# Patient Record
Sex: FEMALE | Race: WHITE | NOT HISPANIC OR LATINO | Employment: OTHER | URBAN - METROPOLITAN AREA
[De-identification: names, ages, dates, MRNs, and addresses within clinical notes are randomized per-mention and may not be internally consistent; named-entity substitution may affect disease eponyms.]

---

## 2017-05-19 ENCOUNTER — HOSPITAL ENCOUNTER (INPATIENT)
Facility: HOSPITAL | Age: 82
LOS: 5 days | Discharge: HOSPICE | DRG: 872 | End: 2017-05-24
Attending: EMERGENCY MEDICINE | Admitting: INTERNAL MEDICINE
Payer: MEDICARE

## 2017-05-19 DIAGNOSIS — R79.89 ELEVATED TROPONIN: ICD-10-CM

## 2017-05-19 DIAGNOSIS — I48.91 ATRIAL FIBRILLATION WITH RVR: Primary | ICD-10-CM

## 2017-05-19 DIAGNOSIS — J20.9 ACUTE BRONCHITIS, UNSPECIFIED ORGANISM: ICD-10-CM

## 2017-05-19 DIAGNOSIS — R00.0 TACHYCARDIA: ICD-10-CM

## 2017-05-19 DIAGNOSIS — I48.91 A-FIB: ICD-10-CM

## 2017-05-19 PROBLEM — A41.9 SEPSIS: Status: ACTIVE | Noted: 2017-05-19

## 2017-05-19 PROBLEM — N17.9 ACUTE RENAL FAILURE: Status: ACTIVE | Noted: 2017-05-19

## 2017-05-19 PROBLEM — E03.4 ATROPHY OF THYROID: Status: ACTIVE | Noted: 2017-05-19

## 2017-05-19 PROBLEM — E78.2 MIXED HYPERLIPIDEMIA: Status: ACTIVE | Noted: 2017-05-19

## 2017-05-19 PROBLEM — I10 ESSENTIAL HYPERTENSION, BENIGN: Status: ACTIVE | Noted: 2017-05-19

## 2017-05-19 LAB
ALBUMIN SERPL BCP-MCNC: 2.7 G/DL
ALP SERPL-CCNC: 82 U/L
ALT SERPL W/O P-5'-P-CCNC: 38 U/L
ANION GAP SERPL CALC-SCNC: 12 MMOL/L
AORTIC VALVE REGURGITATION: ABNORMAL
AST SERPL-CCNC: 45 U/L
BASOPHILS # BLD AUTO: 0.02 K/UL
BASOPHILS NFR BLD: 0.1 %
BILIRUB SERPL-MCNC: 2.5 MG/DL
BUN SERPL-MCNC: 37 MG/DL
CALCIUM SERPL-MCNC: 8.5 MG/DL
CHLORIDE SERPL-SCNC: 95 MMOL/L
CO2 SERPL-SCNC: 26 MMOL/L
CREAT SERPL-MCNC: 1.5 MG/DL
DIASTOLIC DYSFUNCTION: NO
DIFFERENTIAL METHOD: ABNORMAL
EOSINOPHIL # BLD AUTO: 0 K/UL
EOSINOPHIL NFR BLD: 0 %
ERYTHROCYTE [DISTWIDTH] IN BLOOD BY AUTOMATED COUNT: 14.6 %
EST. GFR  (AFRICAN AMERICAN): 34 ML/MIN/1.73 M^2
EST. GFR  (NON AFRICAN AMERICAN): 29 ML/MIN/1.73 M^2
ESTIMATED PA SYSTOLIC PRESSURE: 41.64
GLOBAL PERICARDIAL EFFUSION: ABNORMAL
GLUCOSE SERPL-MCNC: 180 MG/DL
HCT VFR BLD AUTO: 40.9 %
HGB BLD-MCNC: 14.2 G/DL
LYMPHOCYTES # BLD AUTO: 0.6 K/UL
LYMPHOCYTES NFR BLD: 4.1 %
MAGNESIUM SERPL-MCNC: 3.1 MG/DL
MCH RBC QN AUTO: 32.6 PG
MCHC RBC AUTO-ENTMCNC: 34.7 %
MCV RBC AUTO: 94 FL
MITRAL VALVE MOBILITY: NORMAL
MONOCYTES # BLD AUTO: 1.6 K/UL
MONOCYTES NFR BLD: 10.1 %
NEUTROPHILS # BLD AUTO: 13.3 K/UL
NEUTROPHILS NFR BLD: 85.7 %
PLATELET # BLD AUTO: 156 K/UL
PMV BLD AUTO: 12.3 FL
POTASSIUM SERPL-SCNC: 4.6 MMOL/L
PROT SERPL-MCNC: 7 G/DL
RBC # BLD AUTO: 4.36 M/UL
RETIRED EF AND QEF - SEE NOTES: 60 (ref 55–65)
SODIUM SERPL-SCNC: 133 MMOL/L
T4 FREE SERPL-MCNC: 1.06 NG/DL
TRICUSPID VALVE REGURGITATION: ABNORMAL
TROPONIN I SERPL DL<=0.01 NG/ML-MCNC: 0.09 NG/ML
TROPONIN I SERPL DL<=0.01 NG/ML-MCNC: 0.1 NG/ML
TSH SERPL DL<=0.005 MIU/L-ACNC: 0.39 UIU/ML
WBC # BLD AUTO: 15.51 K/UL

## 2017-05-19 PROCEDURE — 93306 TTE W/DOPPLER COMPLETE: CPT

## 2017-05-19 PROCEDURE — 83735 ASSAY OF MAGNESIUM: CPT

## 2017-05-19 PROCEDURE — 25000003 PHARM REV CODE 250: Performed by: EMERGENCY MEDICINE

## 2017-05-19 PROCEDURE — 99223 1ST HOSP IP/OBS HIGH 75: CPT | Mod: ,,, | Performed by: INTERNAL MEDICINE

## 2017-05-19 PROCEDURE — 25000003 PHARM REV CODE 250: Performed by: INTERNAL MEDICINE

## 2017-05-19 PROCEDURE — 93306 TTE W/DOPPLER COMPLETE: CPT | Mod: 26,,, | Performed by: INTERNAL MEDICINE

## 2017-05-19 PROCEDURE — 85025 COMPLETE CBC W/AUTO DIFF WBC: CPT

## 2017-05-19 PROCEDURE — 20000000 HC ICU ROOM

## 2017-05-19 PROCEDURE — 96361 HYDRATE IV INFUSION ADD-ON: CPT

## 2017-05-19 PROCEDURE — 96365 THER/PROPH/DIAG IV INF INIT: CPT

## 2017-05-19 PROCEDURE — 84439 ASSAY OF FREE THYROXINE: CPT

## 2017-05-19 PROCEDURE — 36415 COLL VENOUS BLD VENIPUNCTURE: CPT

## 2017-05-19 PROCEDURE — 84443 ASSAY THYROID STIM HORMONE: CPT

## 2017-05-19 PROCEDURE — 63600175 PHARM REV CODE 636 W HCPCS: Performed by: INTERNAL MEDICINE

## 2017-05-19 PROCEDURE — 84484 ASSAY OF TROPONIN QUANT: CPT | Mod: 91

## 2017-05-19 PROCEDURE — 80053 COMPREHEN METABOLIC PANEL: CPT

## 2017-05-19 PROCEDURE — 96366 THER/PROPH/DIAG IV INF ADDON: CPT

## 2017-05-19 PROCEDURE — 96376 TX/PRO/DX INJ SAME DRUG ADON: CPT

## 2017-05-19 PROCEDURE — 99291 CRITICAL CARE FIRST HOUR: CPT | Mod: 25

## 2017-05-19 RX ORDER — SIMVASTATIN 20 MG/1
20 TABLET, FILM COATED ORAL NIGHTLY
COMMUNITY

## 2017-05-19 RX ORDER — CLONAZEPAM 0.5 MG/1
0.5 TABLET ORAL 2 TIMES DAILY PRN
Status: DISCONTINUED | OUTPATIENT
Start: 2017-05-19 | End: 2017-05-23

## 2017-05-19 RX ORDER — SODIUM CHLORIDE 9 MG/ML
500 INJECTION, SOLUTION INTRAVENOUS
Status: COMPLETED | OUTPATIENT
Start: 2017-05-19 | End: 2017-05-19

## 2017-05-19 RX ORDER — DIGOXIN 0.25 MG/ML
250 INJECTION INTRAMUSCULAR; INTRAVENOUS ONCE
Status: COMPLETED | OUTPATIENT
Start: 2017-05-19 | End: 2017-05-19

## 2017-05-19 RX ORDER — FUROSEMIDE 20 MG/1
20 TABLET ORAL 2 TIMES DAILY
COMMUNITY

## 2017-05-19 RX ORDER — ATENOLOL 50 MG/1
50 TABLET ORAL DAILY
Status: DISCONTINUED | OUTPATIENT
Start: 2017-05-19 | End: 2017-05-19

## 2017-05-19 RX ORDER — FUROSEMIDE 20 MG/1
20 TABLET ORAL 2 TIMES DAILY
Status: DISCONTINUED | OUTPATIENT
Start: 2017-05-19 | End: 2017-05-24 | Stop reason: HOSPADM

## 2017-05-19 RX ORDER — LISINOPRIL 20 MG/1
20 TABLET ORAL DAILY
Status: DISCONTINUED | OUTPATIENT
Start: 2017-05-19 | End: 2017-05-20

## 2017-05-19 RX ORDER — AMLODIPINE BESYLATE 2.5 MG/1
2.5 TABLET ORAL DAILY
COMMUNITY

## 2017-05-19 RX ORDER — SIMVASTATIN 10 MG/1
20 TABLET, FILM COATED ORAL NIGHTLY
Status: DISCONTINUED | OUTPATIENT
Start: 2017-05-19 | End: 2017-05-24 | Stop reason: HOSPADM

## 2017-05-19 RX ORDER — AMLODIPINE BESYLATE 5 MG/1
5 TABLET ORAL DAILY
Status: ON HOLD | COMMUNITY
End: 2017-05-24 | Stop reason: HOSPADM

## 2017-05-19 RX ORDER — DILTIAZEM HYDROCHLORIDE 30 MG/1
60 TABLET, FILM COATED ORAL
Status: COMPLETED | OUTPATIENT
Start: 2017-05-19 | End: 2017-05-19

## 2017-05-19 RX ORDER — CLONAZEPAM 0.5 MG/1
0.5 TABLET ORAL 2 TIMES DAILY PRN
COMMUNITY

## 2017-05-19 RX ORDER — ATENOLOL 50 MG/1
50 TABLET ORAL DAILY
Status: ON HOLD | COMMUNITY
End: 2017-05-24 | Stop reason: HOSPADM

## 2017-05-19 RX ORDER — LEVOTHYROXINE SODIUM 88 UG/1
88 TABLET ORAL DAILY
Status: ON HOLD | COMMUNITY
End: 2017-05-24 | Stop reason: HOSPADM

## 2017-05-19 RX ORDER — LEVOTHYROXINE SODIUM 88 UG/1
88 TABLET ORAL
Status: DISCONTINUED | OUTPATIENT
Start: 2017-05-19 | End: 2017-05-22

## 2017-05-19 RX ORDER — AMLODIPINE BESYLATE 5 MG/1
5 TABLET ORAL DAILY
Status: DISCONTINUED | OUTPATIENT
Start: 2017-05-19 | End: 2017-05-19

## 2017-05-19 RX ORDER — DILTIAZEM HCL 1 MG/ML
5 INJECTION, SOLUTION INTRAVENOUS CONTINUOUS
Status: DISCONTINUED | OUTPATIENT
Start: 2017-05-19 | End: 2017-05-19

## 2017-05-19 RX ORDER — AMLODIPINE BESYLATE 2.5 MG/1
2.5 TABLET ORAL DAILY
Status: DISCONTINUED | OUTPATIENT
Start: 2017-05-19 | End: 2017-05-19

## 2017-05-19 RX ORDER — DILTIAZEM HYDROCHLORIDE 5 MG/ML
10 INJECTION INTRAVENOUS
Status: COMPLETED | OUTPATIENT
Start: 2017-05-19 | End: 2017-05-19

## 2017-05-19 RX ORDER — LISINOPRIL 20 MG/1
20 TABLET ORAL DAILY
COMMUNITY

## 2017-05-19 RX ORDER — DEXTROSE MONOHYDRATE, SODIUM CHLORIDE, AND POTASSIUM CHLORIDE 50; 1.49; 4.5 G/1000ML; G/1000ML; G/1000ML
INJECTION, SOLUTION INTRAVENOUS CONTINUOUS
Status: DISCONTINUED | OUTPATIENT
Start: 2017-05-19 | End: 2017-05-24 | Stop reason: HOSPADM

## 2017-05-19 RX ADMIN — CLONAZEPAM 0.5 MG: 0.5 TABLET ORAL at 09:05

## 2017-05-19 RX ADMIN — DILTIAZEM HYDROCHLORIDE 10 MG: 5 INJECTION INTRAVENOUS at 11:05

## 2017-05-19 RX ADMIN — LEVOTHYROXINE SODIUM 88 MCG: 88 TABLET ORAL at 03:05

## 2017-05-19 RX ADMIN — SIMVASTATIN 20 MG: 10 TABLET, FILM COATED ORAL at 09:05

## 2017-05-19 RX ADMIN — DEXTROSE MONOHYDRATE, SODIUM CHLORIDE, AND POTASSIUM CHLORIDE: 50; 4.5; 1.49 INJECTION, SOLUTION INTRAVENOUS at 01:05

## 2017-05-19 RX ADMIN — FUROSEMIDE 20 MG: 20 TABLET ORAL at 06:05

## 2017-05-19 RX ADMIN — DILTIAZEM HYDROCHLORIDE 5 MG/HR: 5 INJECTION INTRAVENOUS at 12:05

## 2017-05-19 RX ADMIN — SODIUM CHLORIDE 500 ML: 0.9 INJECTION, SOLUTION INTRAVENOUS at 10:05

## 2017-05-19 RX ADMIN — AMIODARONE HYDROCHLORIDE 1 MG/MIN: 1.8 INJECTION, SOLUTION INTRAVENOUS at 03:05

## 2017-05-19 RX ADMIN — DIGOXIN 250 MCG: 0.25 INJECTION INTRAMUSCULAR; INTRAVENOUS at 06:05

## 2017-05-19 RX ADMIN — CEFTRIAXONE 1 G: 1 INJECTION, SOLUTION INTRAVENOUS at 03:05

## 2017-05-19 RX ADMIN — AMIODARONE HYDROCHLORIDE 150 MG: 1.5 INJECTION, SOLUTION INTRAVENOUS at 03:05

## 2017-05-19 RX ADMIN — AMIODARONE HYDROCHLORIDE 0.5 MG/MIN: 1.8 INJECTION, SOLUTION INTRAVENOUS at 09:05

## 2017-05-19 RX ADMIN — DILTIAZEM HYDROCHLORIDE 60 MG: 30 TABLET, FILM COATED ORAL at 11:05

## 2017-05-19 RX ADMIN — AMIODARONE HYDROCHLORIDE 150 MG: 1.5 INJECTION, SOLUTION INTRAVENOUS at 05:05

## 2017-05-19 NOTE — ED NOTES
Spoke to son in law, Hany Rojas at [phone]. Please advise with any updates. He and his wife will be flying down tomorrow to discuss POC.

## 2017-05-19 NOTE — CONSULTS
[facility]  Cardiology  Consult Note    Patient Name: Jenny Perales  MRN: [id number]  Admission Date: 5/19/2017  Hospital Length of Stay: 0 days  Code Status: DNR   Attending Provider: Gail Deutsch MD   Consulting Provider: Homer Charles MD  Primary Care Physician: Gail Deutsch MD  Principal Problem:Atrial fibrillation with RVR    Patient information was obtained from patient and ER records.     Inpatient consult to Cardiology  Consult performed by: HOMER CHARLES  Consult ordered by: SANG DICKEY  Reason for consult: AF/RVR        Subjective:     Chief Complaint:  AF/RVR     HPI:   89 y.o. F, who has past medical history of CHF and A-fib, presents to the ED for evaluation of worsening generalized weakness and cough x2 days. On EMS arrival her HR was 170-180; they administered 20 of Cardizem and the rate came down to 130. Her rate has been over 200 previously and she needed to be cardioverted. 12 lead EKG showed no STEMI. Her cough has been chronic x1 month; she notes some abdominal pain secondary to cough. She also adds she is urinating on herself frequently. She is not sure if she is short of breath with lying flat. She denies chest pain, dizziness, gait issues, fever, chills, nausea and vomiting. On exam she is alert and oriented. She voices her desire for DNR. She ambulates with a walker. She does not smoke, drink or use street drugs.    Pt currently pain free.  Came to ER because of malaise and feelings on impending doom.  Noted to be in AF with RVR.  Rates better controlled with IV dilt.  Pt not eager to have ROMEO/DCCV or be on a blood thinner.      Past Medical History:   Diagnosis Date    Hyperlipidemia     Hypertension     Thyroid disease        Past Surgical History:   Procedure Laterality Date    THYROIDECTOMY         Review of patient's allergies indicates:   Allergen Reactions    Codeine Anxiety       No current facility-administered medications on file  prior to encounter.      No current outpatient prescriptions on file prior to encounter.     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    SpO2: 95 %         Intake/Output Summary (Last 24 hours) at 05/19/17 1445  Last data filed at 05/19/17 1400   Gross per 24 hour   Intake            21.13 ml   Output                0 ml   Net            21.13 ml       Lines/Drains/Airways     Peripheral Intravenous Line                 Peripheral IV - Single Lumen 05/19/17 1033 Left Forearm less than 1 day         Peripheral IV - Single Lumen 05/19/17 1239 Right Antecubital less than 1 day                Physical Exam    Current Medications:   amlodipine  2.5 mg Oral Daily    amlodipine  5 mg Oral Daily    atenolol  50 mg Oral Daily    cefTRIAXone (ROCEPHIN) IVPB  1 g Intravenous Q24H    furosemide  20 mg Oral BID    levothyroxine  88 mcg Oral Before breakfast    lisinopril  20 mg Oral Daily    simvastatin  20 mg Oral QHS      dextrose 5 % and 0.45 % NaCl with KCl 20 mEq 75 mL/hr at 05/19/17 1400    diltiazem 10 mg/hr (05/19/17 1430)     clonazePAM    Laboratory:  CBC:    Recent Labs  Lab 05/19/17  1050   WHITE BLOOD CELL COUNT 15.51  H   HEMOGLOBIN 14.2   HEMATOCRIT 40.9   PLATELETS 156       CHEMISTRIES:    Recent Labs  Lab 05/19/17  1050   GLUCOSE 180 H   SODIUM 133 L   POTASSIUM 4.6   BUN BLD 37 H   CREATININE 1.5 H   EGFR IF  34 A   EGFR IF NON- 29 A   CALCIUM 8.5 L   MAGNESIUM 3.1 H       CARDIAC BIOMARKERS:    Recent Labs  Lab 05/19/17  1050   TROPONIN I 0.104 H       COAGS:        LIPIDS/LFTS:    Recent Labs  Lab 05/19/17  1050   AST 45 H   ALT 38     No results found for: TSH      Diagnostic Results:  ECG (personally reviewed tracings):   5/19/17 1033 , inflat ST abnl ?isch    Chest X-Ray (personally reviewed image(s)): 5/19/17 NAD    Echo: ordered      Assessment and Plan:     * Atrial fibrillation with RVR  Change dilt to amio gtt  Stop amlod  Hold atenolol given borderline hemodynamics (and not a good long term choice given her renal insuff)  Digozin as a contingency  Check echo  Check TSH  Will further discuss ROMEO/DCCV and need for OAC with pt in am (she will contemplate for now).  DNR noted.    Elevated troponin  In setting of renal insuff and AF/RVR.  Doubt ACS.  No plan for isch eval given DNR status.  Will plan to restart BBl and cont statin rx.    Acute bronchitis  Monitor creat      VTE Risk Mitigation         Ordered     Medium Risk of VTE  Once      05/19/17 1346     Place SHAYLEE hose  Until discontinued      05/19/17 1346     Place sequential compression device  Until discontinued      05/19/17 1346          Thank you for your consult. I will follow-up with patient. Please contact us if you have any additional questions.    Homer Suero MD  Cardiology   [facility]

## 2017-05-19 NOTE — PLAN OF CARE
Problem: Patient Care Overview  Goal: Plan of Care Review  Outcome: Ongoing (interventions implemented as appropriate)  Pt remains in ICU, newly transferred from ED this shift. VSS/afebrile/ 5 L NC/denies pain. Originally on cardizem gtt, switched to amiodarone. Pt remains tachycardic HR 's post 150 amio bolus x 2 (see note); 250 mcg digoxin given. Tolerating diet; neurologically in tact. No skin breakdown, safety maintained precautions in place for both.

## 2017-05-19 NOTE — ED PROVIDER NOTES
Encounter Date: 5/19/2017    SCRIBE #1 NOTE: I, Homer Sanchez, am scribing for, and in the presence of,  Zen Alonso MD. I have scribed the following portions of the note - Other sections scribed: ROS, HPI.       History     Chief Complaint   Patient presents with    Tachycardia     pt brought to ED via EMS for generalized weakness and cough, pt with an elevated HR of 170-180s initially with EMS, pt has a history of Afib.     Cough     Review of patient's allergies indicates:   Allergen Reactions    Codeine      HPI Comments: CC: Tachycardia    HPI: This 89 y.o. F, who has no past medical history of CHF and A-fib, presents to the ED for evaluation of worsening generalized weakness and cough x2 days. On EMS arrival her HR was 170-180; they administered 20 of Cardizem and the rate came down to 130. Her rate has been over 200 previously and she needed to be cardioverted. 12 lead EKG showed no STEMI. Her cough has been chronic x1 month; she notes some abdominal pain secondary to cough. She also adds she is urinating on herself frequently. She is not sure if she is short of breath with lying flat. She denies chest pain, dizziness, gait issues, fever, chills, nausea and vomiting. On exam she is alert and oriented. She voices her desire for DNR. She ambulates with a walker. She does not smoke, drink or use street drugs.    The history is provided by the patient.     Past Medical History:   Diagnosis Date    Hyperlipidemia     Hypertension     Thyroid disease      No past surgical history on file.  History reviewed. No pertinent family history.  Social History   Substance Use Topics    Smoking status: None    Smokeless tobacco: None    Alcohol use None     Review of Systems   Constitutional: Negative for chills and fever.   HENT: Negative for sore throat.    Eyes: Negative for visual disturbance.   Respiratory: Positive for cough.    Cardiovascular: Negative for chest pain.   Gastrointestinal: Negative for  nausea and vomiting.   Genitourinary: Positive for enuresis and frequency. Negative for dysuria.   Musculoskeletal: Negative for back pain.   Skin: Negative for rash.   Neurological: Negative for dizziness and headaches.       Physical Exam   Initial Vitals   BP Pulse Resp Temp SpO2   05/19/17 1032 05/19/17 1032 05/19/17 1032 -- 05/19/17 1032   110/70 131 20  98 %     Physical Exam    Nursing note and vitals reviewed.  HENT:   Head: Atraumatic.   Eyes: Conjunctivae and EOM are normal.   Neck: Normal range of motion.   Cardiovascular: Exam reveals no gallop and no friction rub.    No murmur heard.  Irregular tachycardia   Pulmonary/Chest: Breath sounds normal. No respiratory distress.   Abdominal: Soft. There is no tenderness.   Musculoskeletal: Normal range of motion. She exhibits no edema.   Neurological: She is alert and oriented to person, place, and time. She has normal strength. No cranial nerve deficit or sensory deficit.   Psychiatric: She has a normal mood and affect.         ED Course   Critical Care  Date/Time: 5/19/2017 11:54 AM  Performed by: SANG DICKEY  Authorized by: SANG DICKEY   Direct patient critical care time: 34 minutes  Ordering / reviewing critical care time: 10 minutes  Documentation critical care time: 10 minutes  Consulting other physicians critical care time: 5 minutes  Total critical care time (exclusive of procedural time) : 59 minutes  Critical care was necessary to treat or prevent imminent or life-threatening deterioration of the following conditions: cardiac failure, circulatory failure, CNS failure or compromise, respiratory failure and shock.  Critical care was time spent personally by me on the following activities: development of treatment plan with patient or surrogate, discussions with consultants, evaluation of patient's response to treatment, examination of patient, ordering and performing treatments and interventions, obtaining history from patient or surrogate,  ordering and review of laboratory studies, ordering and review of radiographic studies, re-evaluation of patient's condition, pulse oximetry and review of old charts.        Labs Reviewed   CBC W/ AUTO DIFFERENTIAL - Abnormal; Notable for the following:        Result Value    WBC 15.51 (*)     MCH 32.6 (*)     RDW 14.6 (*)     Gran # 13.3 (*)     Lymph # 0.6 (*)     Mono # 1.6 (*)     Gran% 85.7 (*)     Lymph% 4.1 (*)     All other components within normal limits   COMPREHENSIVE METABOLIC PANEL - Abnormal; Notable for the following:     Sodium 133 (*)     Glucose 180 (*)     BUN, Bld 37 (*)     Creatinine 1.5 (*)     Calcium 8.5 (*)     Albumin 2.7 (*)     Total Bilirubin 2.5 (*)     AST 45 (*)     eGFR if  34 (*)     eGFR if non  29 (*)     All other components within normal limits   TROPONIN I - Abnormal; Notable for the following:     Troponin I 0.104 (*)     All other components within normal limits   MAGNESIUM - Abnormal; Notable for the following:     Magnesium 3.1 (*)     All other components within normal limits             Medical Decision Making:   Initial Assessment:   89-year-old female with a history of atrial fibrillation presents from the facility for evaluation of tachycardia, cough and shortness of breath.  On exam the patient is tachycardic with an irregular tachycardia.   patient was given 20 mg of diltiazem IV prior to arrival with EMS.  Blood pressure 112 systolic now.  She only complains of mild shortness of breath.  She remains in A. fib with RVR.  Physical exam is grossly unremarkable.  She is awake, alert and oriented.  I will give her some mild IV fluids and additional dose of diltiazem IV as well as an oral dose.  According to records provided by ebony kelley, the patient has a living will in place.  The patient tells me that she does not want lifesaving measures including chest compressions or any form of life support.  She just wants to be kept comfortable  and not be in pain.  I think this is reasonable and appropriate.  Patient has signed [employer] form.  Labs show mild bump in creatinine.  Troponin 0.1.  There is also a mild leukocytosis of 15,000.    Patient's heart rate has not effectively responded to IV bolus doses of diltiazem.  She'll be put on a IV infusion and admitted to the ICU.  Cardiology will be consulted.            Scribe Attestation:   Scribe #1: I performed the above scribed service and the documentation accurately describes the services I performed. I attest to the accuracy of the note.    Attending Attestation:           Physician Attestation for Scribe:  Physician Attestation Statement for Scribe #1: I, Zen Alonso MD, reviewed documentation, as scribed by Homer Sanchez in my presence, and it is both accurate and complete.                 ED Course     Clinical Impression:   The primary encounter diagnosis was Atrial fibrillation with RVR. A diagnosis of Elevated troponin was also pertinent to this visit.          Zen Alonso MD  05/19/17 [id number]

## 2017-05-19 NOTE — ED NOTES
MD discussed plan of care. Pt expressed wanting DNR. MD went over [employer] with pt, pt verbalized understanding and was signed by MD and pt.

## 2017-05-19 NOTE — NURSING
Situation Principle Problem:  Atrial fibrillation with RVR      Reason for Calling: Pt sustaining -140s post amio infusion    Provider Calling: Dr. Suero   Background Vitals:    05/19/17 1600 05/19/17 1615 05/19/17 1630 05/19/17 1645   BP: 113/66 135/76 127/60    BP Location:       Patient Position:       Pulse: (!) 137 (!) 131 (!) 126 (!) 141   Resp: (!) 32 (!) 32 (!) 32 (!) 35   Temp:       TempSrc:       SpO2: (!) 93% (!) 93% (!) 93% (!) 92%   Weight:       Height:           No results found for: POCTGLUCOSE    Intake/Output:    Intake/Output Summary (Last 24 hours) at 05/19/17 1655  Last data filed at 05/19/17 1512   Gross per 24 hour   Intake           255.26 ml   Output                0 ml   Net           255.26 ml        Assessment What is happening: Pt denies CP or SOB, HR remains high despite change in IV RX therapy   Response Provider Response: administer additional amio bolus, re-evaluate in 1 hour. If HR remains > 120 will give digoxin per MD order

## 2017-05-19 NOTE — PROGRESS NOTES
Pt newly admitted to ICU from ED, -130's, cardizem gtt infusing. Pt AAO x 3; friends at bedside/family out of town but on their way from [place]. Skin in tact, pt denies pain and SOB.  Radiology called with abnormal result report from CXR and requested CT because pt unable to sit up or stand; Dr. Deutsch called and notified.  1415- paged MD; MD responded she is aware of abnormal result and no new orders noted.

## 2017-05-19 NOTE — SUBJECTIVE & OBJECTIVE
Past Medical History:   Diagnosis Date    Hyperlipidemia     Hypertension     Thyroid disease        Past Surgical History:   Procedure Laterality Date    THYROIDECTOMY         Review of patient's allergies indicates:   Allergen Reactions    Codeine        No current facility-administered medications on file prior to encounter.      No current outpatient prescriptions on file prior to encounter.     Family History     None        Social History Main Topics    Smoking status: Not on file    Smokeless tobacco: Not on file    Alcohol use Not on file    Drug use: Not on file    Sexual activity: Not on file     Review of Systems   Constitutional: Positive for fatigue.   Respiratory: Positive for cough and shortness of breath.    All other systems reviewed and are negative.    Objective:     Vital Signs (Most Recent):  Temp: 98.5 °F (36.9 °C) (05/19/17 1246)  Pulse: 108 (05/19/17 1255)  Resp: (!) 33 (05/19/17 1255)  BP: 111/76 (05/19/17 1255)  SpO2: (!) 88 % (05/19/17 1255) Vital Signs (24h Range):  Temp:  [98.5 °F (36.9 °C)-99.9 °F (37.7 °C)] 98.5 °F (36.9 °C)  Pulse:  [108-142] 108  Resp:  [20-40] 33  SpO2:  [84 %-98 %] 88 %  BP: (109-199)/(60-93) 111/76     Weight: 84.8 kg (187 lb)  Body mass index is 35.33 kg/(m^2).    Physical Exam   Constitutional: She is oriented to person, place, and time. She appears well-developed and well-nourished.   Eyes: EOM are normal. Pupils are equal, round, and reactive to light.   Neck: Normal range of motion. Neck supple.   Cardiovascular: Normal heart sounds and normal pulses.  An irregularly irregular rhythm present. Tachycardia present.  Exam reveals no gallop and no friction rub.    No murmur heard.  Pulmonary/Chest: Effort normal. She has rhonchi in the right lower field and the left lower field.   Abdominal: Soft. Bowel sounds are normal.   Neurological: She is alert and oriented to person, place, and time.   Skin: Skin is warm and dry.   Psychiatric: She has a normal  mood and affect.   Vitals reviewed.       Significant Labs:   Blood Culture: No results for input(s): LABBLOO in the last 48 hours.  CBC:   Recent Labs  Lab 05/19/17  1050   WBC 15.51*   HGB 14.2   HCT 40.9        CMP:   Recent Labs  Lab 05/19/17  1050   *   K 4.6   CL 95   CO2 26   *   BUN 37*   CREATININE 1.5*   CALCIUM 8.5*   PROT 7.0   ALBUMIN 2.7*   BILITOT 2.5*   ALKPHOS 82   AST 45*   ALT 38   ANIONGAP 12   EGFRNONAA 29*     Urine Culture: No results for input(s): LABURIN in the last 48 hours.    Significant Imaging:   Multiple overlying cardiac monitoring leads.The cardiomediastinal silhouette is normal in size and midline. Atherosclerotic calcification of the level of aortic arch. Pulmonary vascularity appears within normal limits.    0.8 cm ill-defined nodular opacity projects over the right midlung zone. Recommend repeat PA/lateral radiograph of the chest or CT for further characterization.    Blunting of the left costophrenic angle likely reflects prominence of the epicardial fat. Small volume of pleural fluid or lower lobe atelectasis not excluded. No large lobar consolidation.    No pneumothorax.    Rightward convex curvature of the thoracic spine. Osseous degenerative changes.    Epic notification system activated.

## 2017-05-19 NOTE — ED TRIAGE NOTES
Pt arrived by EMS from [facility]-was given 20mg of Diltiazem for HR in 180's A-fib; pt c/o of generalized weakness and fatigue for a couple of days and a productive cough for several weeks with yellow sputum. Pt denies N/V/F/C/D, chest pain. Pt states that she does not want any heroic measures and just pain control and comfort. PT currently on 5L O2 will continue to monitor.

## 2017-05-19 NOTE — ASSESSMENT & PLAN NOTE
In setting of renal insuff and AF/RVR.  Doubt ACS.  No plan for isch eval given DNR status.  Will plan to restart BBl and cont statin rx.

## 2017-05-19 NOTE — SUBJECTIVE & OBJECTIVE
Past Medical History:   Diagnosis Date    Hyperlipidemia     Hypertension     Thyroid disease        Past Surgical History:   Procedure Laterality Date    THYROIDECTOMY         Review of patient's allergies indicates:   Allergen Reactions    Codeine Anxiety       No current facility-administered medications on file prior to encounter.      No current outpatient prescriptions on file prior to encounter.     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    SpO2: 95 %         Intake/Output Summary (Last 24 hours) at 05/19/17 1445  Last data filed at 05/19/17 1400   Gross per 24 hour   Intake            21.13 ml   Output                0 ml   Net            21.13 ml       Lines/Drains/Airways     Peripheral Intravenous Line                 Peripheral IV - Single Lumen 05/19/17 1033 Left Forearm less than 1 day         Peripheral IV - Single Lumen 05/19/17 1239 Right Antecubital less than 1 day                Physical Exam    Current Medications:   amlodipine  2.5 mg Oral Daily    amlodipine  5 mg Oral Daily    atenolol  50 mg Oral Daily    cefTRIAXone (ROCEPHIN) IVPB  1 g Intravenous Q24H    furosemide  20 mg Oral BID     levothyroxine  88 mcg Oral Before breakfast    lisinopril  20 mg Oral Daily    simvastatin  20 mg Oral QHS      dextrose 5 % and 0.45 % NaCl with KCl 20 mEq 75 mL/hr at 05/19/17 1400    diltiazem 10 mg/hr (05/19/17 1430)     clonazePAM    Laboratory:  CBC:    Recent Labs  Lab 05/19/17  1050   WHITE BLOOD CELL COUNT 15.51 H   HEMOGLOBIN 14.2   HEMATOCRIT 40.9   PLATELETS 156       CHEMISTRIES:    Recent Labs  Lab 05/19/17  1050   GLUCOSE 180 H   SODIUM 133 L   POTASSIUM 4.6   BUN BLD 37 H   CREATININE 1.5 H   EGFR IF  34 A   EGFR IF NON- 29 A   CALCIUM 8.5 L   MAGNESIUM 3.1 H       CARDIAC BIOMARKERS:    Recent Labs  Lab 05/19/17  1050   TROPONIN I 0.104 H       COAGS:        LIPIDS/LFTS:    Recent Labs  Lab 05/19/17  1050   AST 45 H   ALT 38     No results found for: TSH      Diagnostic Results:  ECG (personally reviewed tracings):   5/19/17 1033 , inflat ST abnl ?isch    Chest X-Ray (personally reviewed image(s)): 5/19/17 NAD    Echo: ordered

## 2017-05-19 NOTE — ED NOTES
Bed: 03main  Expected date:   Expected time:   Means of arrival: [facility]  Comments:  Barry

## 2017-05-19 NOTE — H&P
[facility] Medicine  History & Physical    Patient Name: Jenny Perales  MRN: [id number]  Admission Date: 5/19/2017  Attending Physician: Zen Alonso MD   Primary Care Provider: Gail Deutsch MD         Patient information was obtained from patient, caregiver / friend and ER records.     Subjective:     Principal Problem:Atrial fibrillation with RVR    Chief Complaint:   Chief Complaint   Patient presents with    Tachycardia     pt brought to ED via EMS for generalized weakness and cough, pt with an elevated HR of 170-180s initially with EMS, pt has a history of Afib.     Cough        HPI: 89yo with history of dysrhythmia, HTN and increased lipids who presented to the ED with complaints of confusion and shortness of breath.  Pt reports she has had a cold for a couple of weeks.  When a friend saw her today she was confused and they brought her here.  ED found her to be in Atrial fib with RVR.  Pt was started on diltiazem drip in the ed with better control of her rate but still not controlled.  Pt will be admitted to the ICU for rate control and cardiology consult    Past Medical History:   Diagnosis Date    Hyperlipidemia     Hypertension     Thyroid disease        Past Surgical History:   Procedure Laterality Date    THYROIDECTOMY         Review of patient's allergies indicates:   Allergen Reactions    Codeine        No current facility-administered medications on file prior to encounter.      No current outpatient prescriptions on file prior to encounter.     Family History     None        Social History Main Topics    Smoking status: Not on file    Smokeless tobacco: Not on file    Alcohol use Not on file    Drug use: Not on file    Sexual activity: Not on file     Review of Systems   Constitutional: Positive for fatigue.   Respiratory: Positive for cough and shortness of breath.    All other systems reviewed and are negative.    Objective:     Vital Signs (Most  Recent):  Temp: 98.5 °F (36.9 °C) (05/19/17 1246)  Pulse: 108 (05/19/17 1255)  Resp: (!) 33 (05/19/17 1255)  BP: 111/76 (05/19/17 1255)  SpO2: (!) 88 % (05/19/17 1255) Vital Signs (24h Range):  Temp:  [98.5 °F (36.9 °C)-99.9 °F (37.7 °C)] 98.5 °F (36.9 °C)  Pulse:  [108-142] 108  Resp:  [20-40] 33  SpO2:  [84 %-98 %] 88 %  BP: (109-199)/(60-93) 111/76     Weight: 84.8 kg (187 lb)  Body mass index is 35.33 kg/(m^2).    Physical Exam   Constitutional: She is oriented to person, place, and time. She appears well-developed and well-nourished.   Eyes: EOM are normal. Pupils are equal, round, and reactive to light.   Neck: Normal range of motion. Neck supple.   Cardiovascular: Normal heart sounds and normal pulses.  An irregularly irregular rhythm present. Tachycardia present.  Exam reveals no gallop and no friction rub.    No murmur heard.  Pulmonary/Chest: Effort normal. She has rhonchi in the right lower field and the left lower field.   Abdominal: Soft. Bowel sounds are normal.   Neurological: She is alert and oriented to person, place, and time.   Skin: Skin is warm and dry.   Psychiatric: She has a normal mood and affect.   Vitals reviewed.       Significant Labs:   Blood Culture: No results for input(s): LABBLOO in the last 48 hours.  CBC:   Recent Labs  Lab 05/19/17  1050   WBC 15.51*   HGB 14.2   HCT 40.9        CMP:   Recent Labs  Lab 05/19/17  1050   *   K 4.6   CL 95   CO2 26   *   BUN 37*   CREATININE 1.5*   CALCIUM 8.5*   PROT 7.0   ALBUMIN 2.7*   BILITOT 2.5*   ALKPHOS 82   AST 45*   ALT 38   ANIONGAP 12   EGFRNONAA 29*     Urine Culture: No results for input(s): LABURIN in the last 48 hours.    Significant Imaging:   Multiple overlying cardiac monitoring leads.The cardiomediastinal silhouette is normal in size and midline. Atherosclerotic calcification of the level of aortic arch. Pulmonary vascularity appears within normal limits.    0.8 cm ill-defined nodular opacity projects over the  right midlung zone. Recommend repeat PA/lateral radiograph of the chest or CT for further characterization.    Blunting of the left costophrenic angle likely reflects prominence of the epicardial fat. Small volume of pleural fluid or lower lobe atelectasis not excluded. No large lobar consolidation.    No pneumothorax.    Rightward convex curvature of the thoracic spine. Osseous degenerative changes.    Epic notification system activated.     Assessment/Plan:     * Atrial fibrillation with RVR  Pt will be admitted to the ICU on diltiazem drip.  Cardiology has been consulted as well.  Pt is a DNR      Essential hypertension, benign  Continue home meds for now      Atrophy of thyroid  Will check TSH.  Continue levothyroxine      Mixed hyperlipidemia  Continue meds      Sepsis  Will add rocephin and add IVF      Acute bronchitis  Recheck x-ray in the morning      Acute renal failure  Will give IVF and repeat labs in the AM      VTE Risk Mitigation     None        Gail Deutsch MD  Department of Hospital Medicine   [facility]

## 2017-05-19 NOTE — ASSESSMENT & PLAN NOTE
Change dilt to amio gtt  Stop amlod  Hold atenolol given borderline hemodynamics (and not a good long term choice given her renal insuff)  Digozin as a contingency  Check echo  Check TSH  Will further discuss ROMEO/DCCV and need for OAC with pt in am (she will contemplate for now).  DNR noted.

## 2017-05-20 LAB
ALBUMIN SERPL BCP-MCNC: 2.2 G/DL
ALP SERPL-CCNC: 101 U/L
ALT SERPL W/O P-5'-P-CCNC: 74 U/L
ANION GAP SERPL CALC-SCNC: 11 MMOL/L
AST SERPL-CCNC: 121 U/L
BASOPHILS # BLD AUTO: 0.02 K/UL
BASOPHILS NFR BLD: 0.2 %
BILIRUB SERPL-MCNC: 1.4 MG/DL
BUN SERPL-MCNC: 33 MG/DL
CALCIUM SERPL-MCNC: 7.4 MG/DL
CHLORIDE SERPL-SCNC: 97 MMOL/L
CO2 SERPL-SCNC: 24 MMOL/L
CREAT SERPL-MCNC: 1.2 MG/DL
DIFFERENTIAL METHOD: ABNORMAL
EOSINOPHIL # BLD AUTO: 0 K/UL
EOSINOPHIL NFR BLD: 0.1 %
ERYTHROCYTE [DISTWIDTH] IN BLOOD BY AUTOMATED COUNT: 14.7 %
EST. GFR  (AFRICAN AMERICAN): 44 ML/MIN/1.73 M^2
EST. GFR  (NON AFRICAN AMERICAN): 39 ML/MIN/1.73 M^2
GLUCOSE SERPL-MCNC: 158 MG/DL
HCT VFR BLD AUTO: 37.8 %
HGB BLD-MCNC: 12.8 G/DL
LYMPHOCYTES # BLD AUTO: 0.7 K/UL
LYMPHOCYTES NFR BLD: 5.3 %
MCH RBC QN AUTO: 31.3 PG
MCHC RBC AUTO-ENTMCNC: 33.9 %
MCV RBC AUTO: 92 FL
MONOCYTES # BLD AUTO: 1.5 K/UL
MONOCYTES NFR BLD: 11.6 %
NEUTROPHILS # BLD AUTO: 10.7 K/UL
NEUTROPHILS NFR BLD: 82.8 %
PLATELET # BLD AUTO: 142 K/UL
PMV BLD AUTO: 11.7 FL
POTASSIUM SERPL-SCNC: 3.4 MMOL/L
PROT SERPL-MCNC: 6.2 G/DL
RBC # BLD AUTO: 4.09 M/UL
SODIUM SERPL-SCNC: 132 MMOL/L
TROPONIN I SERPL DL<=0.01 NG/ML-MCNC: 0.07 NG/ML
WBC # BLD AUTO: 12.87 K/UL

## 2017-05-20 PROCEDURE — 99233 SBSQ HOSP IP/OBS HIGH 50: CPT | Mod: ,,, | Performed by: INTERNAL MEDICINE

## 2017-05-20 PROCEDURE — 20000000 HC ICU ROOM

## 2017-05-20 PROCEDURE — 80053 COMPREHEN METABOLIC PANEL: CPT

## 2017-05-20 PROCEDURE — 25000003 PHARM REV CODE 250

## 2017-05-20 PROCEDURE — 27000221 HC OXYGEN, UP TO 24 HOURS

## 2017-05-20 PROCEDURE — 25000003 PHARM REV CODE 250: Performed by: INTERNAL MEDICINE

## 2017-05-20 PROCEDURE — 85025 COMPLETE CBC W/AUTO DIFF WBC: CPT

## 2017-05-20 PROCEDURE — 63600175 PHARM REV CODE 636 W HCPCS: Performed by: INTERNAL MEDICINE

## 2017-05-20 PROCEDURE — 36415 COLL VENOUS BLD VENIPUNCTURE: CPT

## 2017-05-20 PROCEDURE — 93005 ELECTROCARDIOGRAM TRACING: CPT

## 2017-05-20 PROCEDURE — 25000003 PHARM REV CODE 250: Performed by: EMERGENCY MEDICINE

## 2017-05-20 RX ORDER — METOPROLOL TARTRATE 25 MG/1
25 TABLET, FILM COATED ORAL 2 TIMES DAILY
Status: DISCONTINUED | OUTPATIENT
Start: 2017-05-20 | End: 2017-05-21

## 2017-05-20 RX ORDER — ACETAMINOPHEN 325 MG/1
650 TABLET ORAL EVERY 6 HOURS PRN
Status: DISCONTINUED | OUTPATIENT
Start: 2017-05-20 | End: 2017-05-24 | Stop reason: HOSPADM

## 2017-05-20 RX ADMIN — GUAIFENESIN AND DEXTROMETHORPHAN HYDROBROMIDE 1 TABLET: 600; 30 TABLET, EXTENDED RELEASE ORAL at 03:05

## 2017-05-20 RX ADMIN — FUROSEMIDE 20 MG: 20 TABLET ORAL at 06:05

## 2017-05-20 RX ADMIN — CEFTRIAXONE 1 G: 1 INJECTION, SOLUTION INTRAVENOUS at 02:05

## 2017-05-20 RX ADMIN — METOPROLOL TARTRATE 25 MG: 25 TABLET ORAL at 10:05

## 2017-05-20 RX ADMIN — CLONAZEPAM 0.5 MG: 0.5 TABLET ORAL at 10:05

## 2017-05-20 RX ADMIN — DEXTROSE MONOHYDRATE, SODIUM CHLORIDE, AND POTASSIUM CHLORIDE: 50; 4.5; 1.49 INJECTION, SOLUTION INTRAVENOUS at 08:05

## 2017-05-20 RX ADMIN — FUROSEMIDE 20 MG: 20 TABLET ORAL at 10:05

## 2017-05-20 RX ADMIN — ACETAMINOPHEN 650 MG: 325 TABLET, FILM COATED ORAL at 03:05

## 2017-05-20 RX ADMIN — AMIODARONE HYDROCHLORIDE 0.5 MG/MIN: 1.8 INJECTION, SOLUTION INTRAVENOUS at 11:05

## 2017-05-20 RX ADMIN — DEXTROSE MONOHYDRATE, SODIUM CHLORIDE, AND POTASSIUM CHLORIDE: 50; 4.5; 1.49 INJECTION, SOLUTION INTRAVENOUS at 06:05

## 2017-05-20 RX ADMIN — DEXTROSE MONOHYDRATE, SODIUM CHLORIDE, AND POTASSIUM CHLORIDE: 50; 4.5; 1.49 INJECTION, SOLUTION INTRAVENOUS at 05:05

## 2017-05-20 RX ADMIN — LEVOTHYROXINE SODIUM 88 MCG: 88 TABLET ORAL at 05:05

## 2017-05-20 RX ADMIN — AMIODARONE HYDROCHLORIDE 0.5 MG/MIN: 1.8 INJECTION, SOLUTION INTRAVENOUS at 10:05

## 2017-05-20 RX ADMIN — SIMVASTATIN 20 MG: 10 TABLET, FILM COATED ORAL at 10:05

## 2017-05-20 NOTE — ASSESSMENT & PLAN NOTE
"AF/RVR persists depsite amio gtt and dig boluses.  BP a little better today.  EF normal, no sig valve disease noted on echo.  I again offered ROMEO/DCCV and attendant need for OAC, and pt still seems resistant to this.  The other option is hospice and the pt seemed more amenable to the latter option.  I will defer further discussion to Dr. Deutsch (despite my explaining to pt that we might be able to "fix" her arrhythmia and make her feel better.  In an attempt for better rate control, I will stop lisinopril and initiate metoprolol 25mg bid.  "

## 2017-05-20 NOTE — PLAN OF CARE
Problem: Patient Care Overview  Goal: Plan of Care Review  Outcome: Ongoing (interventions implemented as appropriate)  Pt remains on amio gtt, currently at 0.5 mg/min. Still in afib, -130s. Vitals otherwise stable. Free from falls, trauma, and injury.

## 2017-05-20 NOTE — SUBJECTIVE & OBJECTIVE
Past Medical History:   Diagnosis Date    Hyperlipidemia     Hypertension     Thyroid disease        Past Surgical History:   Procedure Laterality Date    THYROIDECTOMY         Review of patient's allergies indicates:   Allergen Reactions    Codeine Anxiety       No current facility-administered medications on file prior to encounter.      No current outpatient prescriptions on file prior to encounter.     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    SpO2: 96 %  O2 Device (Oxygen Therapy): venti mask      Intake/Output Summary (Last 24 hours) at 05/20/17 0927  Last data filed at 05/20/17 0600   Gross per 24 hour   Intake          2166.22 ml   Output                0 ml   Net          2166.22 ml       Lines/Drains/Airways     Peripheral Intravenous Line                 Peripheral IV - Single Lumen 05/19/17 1033 Left Forearm less than 1 day         Peripheral IV - Single Lumen 05/19/17 1239 Right Antecubital less than 1 day                Physical Exam   Constitutional: She is oriented to person, place, and time. She appears well-developed and well-nourished. No distress.   HENT:   Head: Normocephalic and atraumatic.   Eyes:  Conjunctivae are normal. Pupils are equal, round, and reactive to light. No scleral icterus.   Neck: Normal range of motion. Neck supple. No JVD present.   Cardiovascular: S1 normal and S2 normal.  An irregularly irregular rhythm present. Tachycardia present.  Exam reveals distant heart sounds.    Pulmonary/Chest: Effort normal and breath sounds normal. No respiratory distress. She has no wheezes.   Abdominal: Soft. Bowel sounds are normal.   obese   Musculoskeletal: Normal range of motion. She exhibits no edema.   Neurological: She is alert and oriented to person, place, and time.   Skin: Skin is warm and dry. She is not diaphoretic.   Psychiatric: She has a normal mood and affect. Her behavior is normal.       Current Medications:   cefTRIAXone (ROCEPHIN) IVPB  1 g Intravenous Q24H    furosemide  20 mg Oral BID    levothyroxine  88 mcg Oral Before breakfast    lisinopril  20 mg Oral Daily    simvastatin  20 mg Oral QHS      amiodarone 0.5 mg/min (05/20/17 0600)    dextrose 5 % and 0.45 % NaCl with KCl 20 mEq 75 mL/hr at 05/20/17 0600     clonazePAM    Laboratory:  CBC:    Recent Labs  Lab 05/19/17  1050 05/20/17  0153   WHITE BLOOD CELL COUNT 15.51 H 12.87 H   HEMOGLOBIN 14.2 12.8   HEMATOCRIT 40.9 37.8   PLATELETS 156 142 L       CHEMISTRIES:    Recent Labs  Lab 05/19/17  1050 05/20/17  0153   GLUCOSE 180 H 158 H   SODIUM 133 L 132 L   POTASSIUM 4.6 3.4 L   BUN BLD 37 H 33 H   CREATININE 1.5 H 1.2   EGFR IF  34 A 44 A   EGFR IF NON- 29 A 39 A   CALCIUM 8.5 L 7.4 L   MAGNESIUM 3.1 H  --        CARDIAC BIOMARKERS:    Recent Labs  Lab 05/19/17  1050 05/19/17  1839 05/19/17  2319   TROPONIN I 0.104 H 0.093 H 0.073 H       COAGS:        LIPIDS/LFTS:    Recent Labs  Lab 05/19/17  1050 05/20/17  0153   AST 45 H 121 H   ALT 38 74 H     Lab Results   Component Value Date    TSH 0.388 (L) 05/19/2017     Free T4 1.06 (5/19/17)    Diagnostic Results:  ECG (personally reviewed tracings):    5/19/17 1033 , inflat ST abnl ?isch    Chest X-Ray (personally reviewed image(s)): 5/19/17 NAD    Echo: 5/19/17 (images pers rev)    1 - Normal left ventricular systolic function (EF 60-65%).     2 - No wall motion abnormalities.     3 - Concentric remodeling.     4 - Upper limit of normal aortic root, 3.7 cm.     5 - Trivial aortic regurgitation.     6 - Trivial tricuspid regurgitation.     7 - Pulmonary hypertension. The estimated PA systolic pressure is 42 mmHg.

## 2017-05-20 NOTE — PROGRESS NOTES
Dr Suero at bedside w/ patient. MD updated on pt HR continued in 130's. Pt incontinent and w/ frequent changes, HR jumps to 150's. Catheter placed and pt stated she felt relief immediately 500cc urine returned upon insertion.

## 2017-05-20 NOTE — PLAN OF CARE
05/20/17 1520   Discharge Assessment   Assessment Type Discharge Planning Assessment   Confirmed/corrected address and phone number on facesheet? Yes   Assessment information obtained from? Patient   Expected Length of Stay (days) 3   Communicated expected length of stay with patient/caregiver yes   Prior to hospitilization cognitive status: Alert/Oriented   Prior to hospitalization functional status: Assistive Equipment  (independent with ADL's only with use of DMe)   Current cognitive status: Alert/Oriented   Current Functional Status: Needs Assistance   Arrived From independent living facility  ([facility])   Lives With alone   Able to Return to Prior Arrangements yes   Is patient able to care for self after discharge? Unable to determine at this time (comments)   Who are your caregiver(s) and their phone number(s)? patient states that she will need HH because [facility] are Out Of Town   Patient's perception of discharge disposition home health   Readmission Within The Last 30 Days no previous admission in last 30 days   Patient currently being followed by outpatient case management? No   Patient currently receives home health services? No   Equipment Currently Used at Home rollator;walker, rolling;shower chair   Do you have any problems affording any of your prescribed medications? No   Is the patient taking medications as prescribed? yes   Does the patient have transportation to healthcare appointments? Yes   Transportation Available family or friend will provide   On Dialysis? No   Does the patient receive services at the [facility]mad[facility]? No   Discharge Plan A Home Health   Discharge Plan B (tbd per PT/OT)   Patient/Family In Agreement With Plan yes   TN to patient's room to discuss Helping the patient manage care at home.   TN/SW roll explained to pt.  Teach back method used.

## 2017-05-20 NOTE — PROGRESS NOTES
"Subjective:       Patient ID: Jenny Perales is a 89 y.o. female     LOS: 1 day   89 y.o. y.o.female with history of hypertension, hyperlipidemia and hypothyroidism admitted to [facility] on 05/19/2017 with complaints of confusion and shortness of breath. During initial ER evaluation she was found to be in Atrial fibrillation with RVR. Treatment was started and the patient was transferred to ICU.    Today she C/o fatigue, cough and dyspnea but denies chest pain or hemoptysis.  She is afebrile.      Allergies:      Review of patient's allergies indicates:   Allergen Reactions    Codeine Anxiety       Medications:    Scheduled Meds:   cefTRIAXone (ROCEPHIN) IVPB  1 g Intravenous Q24H    furosemide  20 mg Oral BID    levothyroxine  88 mcg Oral Before breakfast    metoprolol tartrate  25 mg Oral BID    simvastatin  20 mg Oral QHS     Continuous Infusions:   amiodarone 0.5 mg/min (05/20/17 1002)    dextrose 5 % and 0.45 % NaCl with KCl 20 mEq 75 mL/hr at 05/20/17 1000     PRN Meds:.clonazePAM        Objective:    BP (!) 129/58  Pulse (!) 124  Temp 98.4 °F (36.9 °C) (Oral)   Resp 20  Ht 5' 1" (1.549 m)  Wt 77.2 kg (170 lb 3.1 oz)  SpO2 (!) 94%  Breastfeeding? No  BMI 32.16 kg/m2  Physical Exam   Constitutional:   Alert, oriented, mildly dyspneic   HENT:   Head: Normocephalic and atraumatic.   Nose: Nose normal.   Mouth/Throat: No oropharyngeal exudate.   Decreased hearing   Eyes: EOM are normal. Pupils are equal, round, and reactive to light. No scleral icterus.   Neck: Neck supple. No thyromegaly present.   Cardiovascular: An irregularly irregular rhythm present. Tachycardia present.    Pulmonary/Chest:   Decreased basilar BS   Abdominal: Soft. Bowel sounds are normal. She exhibits no distension and no mass. There is no tenderness.   Musculoskeletal: She exhibits no edema.   SCDs in place   Neurological:   No focal deficit   Skin: No rash noted.         Labs:     2D echo with color " flow doppler    Collection Time: 05/19/17  4:04 PM   Result Value Ref Range    EF 60 55 - 65    Diastolic Dysfunction No     Aortic Valve Regurgitation TRIVIAL     Est. PA Systolic Pressure 41.64 (A)     Pericardial Effusion NONE     Mitral Valve Mobility NORMAL     Tricuspid Valve Regurgitation TRIVIAL    Troponin I    Collection Time: 05/19/17  6:39 PM   Result Value Ref Range    Troponin I 0.093 (H) 0.000 - 0.026 ng/mL   Troponin I    Collection Time: 05/19/17 11:19 PM   Result Value Ref Range    Troponin I 0.073 (H) 0.000 - 0.026 ng/mL   CBC auto differential    Collection Time: 05/20/17  1:53 AM   Result Value Ref Range    WBC 12.87 (H) 3.90 - 12.70 K/uL    RBC 4.09 4.00 - 5.40 M/uL    Hemoglobin 12.8 12.0 - 16.0 g/dL    Hematocrit 37.8 37.0 - 48.5 %    MCV 92 82 - 98 fL    MCH 31.3 (H) 27.0 - 31.0 pg    MCHC 33.9 32.0 - 36.0 %    RDW 14.7 (H) 11.5 - 14.5 %    Platelets 142 (L) 150 - 350 K/uL    MPV 11.7 9.2 - 12.9 fL    Gran # 10.7 (H) 1.8 - 7.7 K/uL    Lymph # 0.7 (L) 1.0 - 4.8 K/uL    Mono # 1.5 (H) 0.3 - 1.0 K/uL    Eos # 0.0 0.0 - 0.5 K/uL    Baso # 0.02 0.00 - 0.20 K/uL    Gran% 82.8 (H) 38.0 - 73.0 %    Lymph% 5.3 (L) 18.0 - 48.0 %    Mono% 11.6 4.0 - 15.0 %    Eosinophil% 0.1 0.0 - 8.0 %    Basophil% 0.2 0.0 - 1.9 %    Differential Method Automated    Comprehensive metabolic panel    Collection Time: 05/20/17  1:53 AM   Result Value Ref Range    Sodium 132 (L) 136 - 145 mmol/L    Potassium 3.4 (L) 3.5 - 5.1 mmol/L    Chloride 97 95 - 110 mmol/L    CO2 24 23 - 29 mmol/L    Glucose 158 (H) 70 - 110 mg/dL    BUN, Bld 33 (H) 10 - 30 mg/dL    Creatinine 1.2 0.5 - 1.4 mg/dL    Calcium 7.4 (L) 8.7 - 10.5 mg/dL    Total Protein 6.2 6.0 - 8.4 g/dL    Albumin 2.2 (L) 3.5 - 5.2 g/dL    Total Bilirubin 1.4 (H) 0.1 - 1.0 mg/dL    Alkaline Phosphatase 101 55 - 135 U/L     (H) 10 - 40 U/L    ALT 74 (H) 10 - 44 U/L    Anion Gap 11 8 - 16 mmol/L    eGFR if African American 44 (A) >60 mL/min/1.73 m^2    eGFR if  non  39 (A) >60 mL/min/1.73 m^2     X-Ray Chest AP Portable 5/19/2017 12:02  Impression   Multiple overlying cardiac monitoring leads.The cardiomediastinal silhouette is normal in size and midline. Atherosclerotic calcification of the level of aortic arch. Pulmonary vascularity appears within normal limits.  0.8 cm ill-defined nodular opacity projects over the right midlung zone. Recommend repeat PA/lateral radiograph of the chest or CT for further characterization.  Blunting of the left costophrenic angle likely reflects prominence of the epicardial fat. Small volume of pleural fluid or lower lobe atelectasis not excluded. No large lobar consolidation.  No pneumothorax.  Rightward convex curvature of the thoracic spine. Osseous degenerative changes.        Intake/Output Summary (Last 24 hours) at 05/20/17 1227  Last data filed at 05/20/17 1020   Gross per 24 hour   Intake          2873.02 ml   Output              500 ml   Net          2373.02 ml        Assessment:      *Atrial fibrillation with RVR [I48.91]    Essential hypertension, benign [I10]    Atrophy of thyroid [E03.4]    Mixed hyperlipidemia [E78.2]    Acute bronchitis [J20.9]    Acute renal failure [N17.9]    Elevated troponin [R74.8]       Plan:   Diagnoses and treatment plan discussed with patient including cardiology recommendations.  Continue current management  F/u chest x-ray ordered  She is waiting for her daughter and is requesting hospice.   consulted for discharge planning with Hospice  Case discussed with Nursing staff

## 2017-05-20 NOTE — PROGRESS NOTES
[facility]  Cardiology  Progress Note    Patient Name: Jenny Perales  MRN: [id number]  Admission Date: 5/19/2017  Hospital Length of Stay: 1 days  Code Status: DNR   Attending Physician: Gail Deutsch MD   Primary Care Physician: Gail Deutsch MD  Expected Discharge Date:   Principal Problem:Atrial fibrillation with RVR    Subjective:     Hospital Course:   Interval hx:  Pt seen in ICU, case d/w RN  No cp, still appears winded.  We again discussed options of ROMEO/CV vs med rx.  Pt still resistant to the ides of procedures and seems to be more interested in possible hospice despite my telling her that this is something we might be able to fix.    Tele: AF -130 (pers rev)      Past Medical History:   Diagnosis Date    Hyperlipidemia     Hypertension     Thyroid disease        Past Surgical History:   Procedure Laterality Date    THYROIDECTOMY         Review of patient's allergies indicates:   Allergen Reactions    Codeine Anxiety       No current facility-administered medications on file prior to encounter.      No current outpatient prescriptions on file prior to encounter.     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    SpO2: 96 %  O2 Device (Oxygen Therapy): venti mask      Intake/Output Summary (Last 24 hours) at 05/20/17 0927  Last data filed at 05/20/17 0600   Gross per 24 hour   Intake          2166.22 ml   Output                0 ml   Net          2166.22 ml       Lines/Drains/Airways     Peripheral Intravenous Line                 Peripheral IV - Single Lumen 05/19/17 1033 Left Forearm less than 1 day         Peripheral IV - Single Lumen 05/19/17 1239 Right Antecubital less than 1 day                Physical Exam   Constitutional: She is oriented to person, place, and time. She appears well-developed and well-nourished. No distress.   HENT:   Head: Normocephalic and atraumatic.   Eyes: Conjunctivae are normal. Pupils are equal, round, and reactive to light. No scleral icterus.   Neck: Normal range of motion. Neck supple. No JVD present.   Cardiovascular: S1 normal and S2 normal.  An irregularly irregular rhythm present. Tachycardia present.  Exam reveals distant heart sounds.    Pulmonary/Chest: Effort normal and breath sounds normal. No respiratory distress. She has no wheezes.   Abdominal: Soft. Bowel sounds are normal.   obese   Musculoskeletal: Normal range of motion. She exhibits no edema.   Neurological: She is alert and oriented to person, place, and time.   Skin: Skin is warm and dry. She is not diaphoretic.   Psychiatric: She has a normal mood and affect. Her behavior is normal.       Current Medications:   cefTRIAXone (ROCEPHIN) IVPB  1 g Intravenous Q24H    furosemide  20 mg Oral BID    levothyroxine  88 mcg Oral Before breakfast    lisinopril  20 mg Oral Daily    simvastatin  20 mg Oral QHS      amiodarone 0.5 mg/min (05/20/17 0600)    dextrose 5 % and 0.45 % NaCl with KCl 20 mEq 75 mL/hr at 05/20/17 0600     clonazePAM    Laboratory:  CBC:    Recent Labs  Lab 05/19/17  1050 05/20/17  0153   WHITE BLOOD CELL COUNT 15.51 H 12.87 H   HEMOGLOBIN 14.2 12.8   HEMATOCRIT 40.9 37.8  "  PLATELETS 156 142 L       CHEMISTRIES:    Recent Labs  Lab 05/19/17  1050 05/20/17  0153   GLUCOSE 180 H 158 H   SODIUM 133 L 132 L   POTASSIUM 4.6 3.4 L   BUN BLD 37 H 33 H   CREATININE 1.5 H 1.2   EGFR IF  34 A 44 A   EGFR IF NON- 29 A 39 A   CALCIUM 8.5 L 7.4 L   MAGNESIUM 3.1 H  --        CARDIAC BIOMARKERS:    Recent Labs  Lab 05/19/17  1050 05/19/17  1839 05/19/17  2319   TROPONIN I 0.104 H 0.093 H 0.073 H       COAGS:        LIPIDS/LFTS:    Recent Labs  Lab 05/19/17  1050 05/20/17  0153   AST 45 H 121 H   ALT 38 74 H     Lab Results   Component Value Date    TSH 0.388 (L) 05/19/2017     Free T4 1.06 (5/19/17)    Diagnostic Results:  ECG (personally reviewed tracings):   5/19/17 1033 , inflat ST abnl ?isch    Chest X-Ray (personally reviewed image(s)): 5/19/17 NAD    Echo: 5/19/17 (images pers rev)    1 - Normal left ventricular systolic function (EF 60-65%).     2 - No wall motion abnormalities.     3 - Concentric remodeling.     4 - Upper limit of normal aortic root, 3.7 cm.     5 - Trivial aortic regurgitation.     6 - Trivial tricuspid regurgitation.     7 - Pulmonary hypertension. The estimated PA systolic pressure is 42 mmHg.       Assessment and Plan:     * Atrial fibrillation with RVR  AF/RVR persists depsite amio gtt and dig boluses.  BP a little better today.  EF normal, no sig valve disease noted on echo.  I again offered ROMEO/DCCV and attendant need for OAC, and pt still seems resistant to this.  The other option is hospice and the pt seemed more amenable to the latter option.  I will defer further discussion to Dr. Deutsch (despite my explaining to pt that we might be able to "fix" her arrhythmia and make her feel better.  In an attempt for better rate control, I will stop lisinopril and initiate metoprolol 25mg bid.    Acute renal failure  Improving    Elevated troponin  In setting of renal insuff and AF/RVR.  Doubt ACS.  No plan for isch eval given DNR " status.  Will plan to restart BBl and cont statin rx.    Essential hypertension, benign  As above.      VTE Risk Mitigation         Ordered     Medium Risk of VTE  Once      05/19/17 1346     Place SHAYLEE hose  Until discontinued      05/19/17 1346     Place sequential compression device  Until discontinued      05/19/17 1346          Homer Suero MD  Cardiology  [facility]

## 2017-05-21 PROBLEM — R91.8 OPACITY OF LUNG ON IMAGING STUDY: Status: ACTIVE | Noted: 2017-05-21

## 2017-05-21 PROCEDURE — 63600175 PHARM REV CODE 636 W HCPCS: Performed by: INTERNAL MEDICINE

## 2017-05-21 PROCEDURE — 25000003 PHARM REV CODE 250

## 2017-05-21 PROCEDURE — 99233 SBSQ HOSP IP/OBS HIGH 50: CPT | Mod: ,,, | Performed by: INTERNAL MEDICINE

## 2017-05-21 PROCEDURE — 25000003 PHARM REV CODE 250: Performed by: INTERNAL MEDICINE

## 2017-05-21 PROCEDURE — 20000000 HC ICU ROOM

## 2017-05-21 PROCEDURE — 25000003 PHARM REV CODE 250: Performed by: EMERGENCY MEDICINE

## 2017-05-21 RX ORDER — IBUPROFEN 600 MG/1
600 TABLET ORAL ONCE
Status: COMPLETED | OUTPATIENT
Start: 2017-05-21 | End: 2017-05-21

## 2017-05-21 RX ORDER — ASPIRIN 81 MG/1
81 TABLET ORAL DAILY
Status: DISCONTINUED | OUTPATIENT
Start: 2017-05-21 | End: 2017-05-24 | Stop reason: HOSPADM

## 2017-05-21 RX ORDER — METOPROLOL TARTRATE 50 MG/1
50 TABLET ORAL 2 TIMES DAILY
Status: DISCONTINUED | OUTPATIENT
Start: 2017-05-21 | End: 2017-05-22

## 2017-05-21 RX ADMIN — METOPROLOL TARTRATE 50 MG: 50 TABLET ORAL at 03:05

## 2017-05-21 RX ADMIN — IBUPROFEN 600 MG: 600 TABLET, FILM COATED ORAL at 07:05

## 2017-05-21 RX ADMIN — CLONAZEPAM 0.5 MG: 0.5 TABLET ORAL at 08:05

## 2017-05-21 RX ADMIN — ACETAMINOPHEN 650 MG: 325 TABLET, FILM COATED ORAL at 09:05

## 2017-05-21 RX ADMIN — FUROSEMIDE 20 MG: 20 TABLET ORAL at 09:05

## 2017-05-21 RX ADMIN — CEFTRIAXONE 1 G: 1 INJECTION, SOLUTION INTRAVENOUS at 01:05

## 2017-05-21 RX ADMIN — AMIODARONE HYDROCHLORIDE 0.5 MG/MIN: 1.8 INJECTION, SOLUTION INTRAVENOUS at 09:05

## 2017-05-21 RX ADMIN — METOPROLOL TARTRATE 50 MG: 50 TABLET ORAL at 08:05

## 2017-05-21 RX ADMIN — DEXTROSE MONOHYDRATE, SODIUM CHLORIDE, AND POTASSIUM CHLORIDE: 50; 4.5; 1.49 INJECTION, SOLUTION INTRAVENOUS at 11:05

## 2017-05-21 RX ADMIN — ASPIRIN 81 MG: 81 TABLET, COATED ORAL at 03:05

## 2017-05-21 RX ADMIN — FUROSEMIDE 20 MG: 20 TABLET ORAL at 05:05

## 2017-05-21 RX ADMIN — GUAIFENESIN AND DEXTROMETHORPHAN HYDROBROMIDE 1 TABLET: 600; 30 TABLET, EXTENDED RELEASE ORAL at 03:05

## 2017-05-21 RX ADMIN — METOPROLOL TARTRATE 25 MG: 25 TABLET ORAL at 09:05

## 2017-05-21 RX ADMIN — LEVOTHYROXINE SODIUM 88 MCG: 88 TABLET ORAL at 06:05

## 2017-05-21 RX ADMIN — DEXTROSE MONOHYDRATE, SODIUM CHLORIDE, AND POTASSIUM CHLORIDE: 50; 4.5; 1.49 INJECTION, SOLUTION INTRAVENOUS at 09:05

## 2017-05-21 RX ADMIN — SIMVASTATIN 20 MG: 10 TABLET, FILM COATED ORAL at 08:05

## 2017-05-21 NOTE — HPI
89 y.o. F, who has past medical history of CHF and A-fib, presents to the ED for evaluation of worsening generalized weakness and cough x2 days. On EMS arrival her HR was 170-180; they administered 20 of Cardizem and the rate came down to 130. Her rate has been over 200 previously and she needed to be cardioverted. 12 lead EKG showed no STEMI. Her cough has been chronic x1 month; she notes some abdominal pain secondary to cough. She also adds she is urinating on herself frequently. She is not sure if she is short of breath with lying flat. She denies chest pain, dizziness, gait issues, fever, chills, nausea and vomiting. On exam she is alert and oriented. She voices her desire for DNR. She ambulates with a walker. She does not smoke, drink or use street drugs.    Pt currently pain free.  Came to ER because of malaise and feelings on impending doom.  Noted to be in AF with RVR.  Rates better controlled with IV dilt.  Pt not eager to have ROMEO/DCCV or be on a blood thinner.

## 2017-05-21 NOTE — SUBJECTIVE & OBJECTIVE
Past Medical History:   Diagnosis Date    Hyperlipidemia     Hypertension     Thyroid disease        Past Surgical History:   Procedure Laterality Date    THYROIDECTOMY         Review of patient's allergies indicates:   Allergen Reactions    Codeine Anxiety       No current facility-administered medications on file prior to encounter.      No current outpatient prescriptions on file prior to encounter.     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    SpO2: 95 %  O2 Device (Oxygen Therapy): nasal cannula      Intake/Output Summary (Last 24 hours) at 05/21/17 1352  Last data filed at 05/21/17 1300   Gross per 24 hour   Intake           2370.8 ml   Output             2690 ml   Net           -319.2 ml       Lines/Drains/Airways     Drain                 Urethral Catheter 05/20/17 1019 1 day          Peripheral Intravenous Line                 Peripheral IV - Single Lumen 05/19/17 1033 Left Forearm 2 days         Peripheral IV - Single Lumen 05/19/17 1239 Right Antecubital 2 days                Physical Exam   Constitutional: She is oriented to person, place, and time. She appears well-developed and well-nourished. No distress.   HENT:   Head:  Normocephalic and atraumatic.   Eyes: Conjunctivae are normal. Pupils are equal, round, and reactive to light. No scleral icterus.   Neck: Normal range of motion. Neck supple. No JVD present.   Cardiovascular: S1 normal and S2 normal.  An irregularly irregular rhythm present. Tachycardia present.  Exam reveals distant heart sounds.    Pulmonary/Chest: Effort normal and breath sounds normal. No respiratory distress. She has no wheezes.   Abdominal: Soft. Bowel sounds are normal.   obese   Musculoskeletal: Normal range of motion. She exhibits no edema.   Neurological: She is alert and oriented to person, place, and time.   Skin: Skin is warm and dry. She is not diaphoretic.   Psychiatric: She has a normal mood and affect. Her behavior is normal.       Current Medications:   cefTRIAXone (ROCEPHIN) IVPB  1 g Intravenous Q24H    furosemide  20 mg Oral BID    levothyroxine  88 mcg Oral Before breakfast    metoprolol tartrate  25 mg Oral BID    simvastatin  20 mg Oral QHS      amiodarone 0.5 mg/min (05/21/17 1300)    dextrose 5 % and 0.45 % NaCl with KCl 20 mEq 75 mL/hr at 05/21/17 1300     acetaminophen, clonazePAM, dextromethorphan-guaifenesin  mg    Laboratory:  CBC:    Recent Labs  Lab 05/19/17  1050 05/20/17  0153   WHITE BLOOD CELL COUNT 15.51 H 12.87 H   HEMOGLOBIN 14.2 12.8   HEMATOCRIT 40.9 37.8   PLATELETS 156 142 L       CHEMISTRIES:    Recent Labs  Lab 05/19/17  1050 05/20/17  0153   GLUCOSE 180 H 158 H   SODIUM 133 L 132 L   POTASSIUM 4.6 3.4 L   BUN BLD 37 H 33 H   CREATININE 1.5 H 1.2   EGFR IF  34 A 44 A   EGFR IF NON- 29 A 39 A   CALCIUM 8.5 L 7.4 L   MAGNESIUM 3.1 H  --        CARDIAC BIOMARKERS:    Recent Labs  Lab 05/19/17  1050 05/19/17  1839 05/19/17  2319   TROPONIN I 0.104 H 0.093 H 0.073 H       COAGS:        LIPIDS/LFTS:    Recent Labs  Lab 05/19/17  1050 05/20/17  0153   AST 45 H 121 H   ALT 38 74 H     Lab Results   Component Value Date    TSH 0.388  (L) 05/19/2017     Free T4 1.06 (5/19/17)    Diagnostic Results:  ECG (personally reviewed tracings):   5/19/17 1033 , inflat ST abnl ?isch    Chest X-Ray (personally reviewed image(s)): 5/19/17 NAD    Echo: 5/19/17 (images pers rev)    1 - Normal left ventricular systolic function (EF 60-65%).     2 - No wall motion abnormalities.     3 - Concentric remodeling.     4 - Upper limit of normal aortic root, 3.7 cm.     5 - Trivial aortic regurgitation.     6 - Trivial tricuspid regurgitation.     7 - Pulmonary hypertension. The estimated PA systolic pressure is 42 mmHg.

## 2017-05-21 NOTE — PLAN OF CARE
Problem: Patient Care Overview  Goal: Plan of Care Review  Outcome: Ongoing (interventions implemented as appropriate)  Remains in afib with -130s. Still on amio gtt. Free from falls, trauma, and injury. Pt says that she believes she is dying and expresses wishes to go home and be comfortable.

## 2017-05-21 NOTE — PROGRESS NOTES
[facility]  Cardiology  Progress Note    Patient Name: Jenny Perales  MRN: [id number]  Admission Date: 5/19/2017  Hospital Length of Stay: 2 days  Code Status: DNR   Attending Physician: Gail Deutsch MD   Primary Care Physician: Gail Deutsch MD  Expected Discharge Date:   Principal Problem:Atrial fibrillation with RVR    Subjective:     Hospital Course:   Interval hx:  Pt seen in ICU, case d/w RN  No cp, appears less winded.  We again discussed options of ROMEO/CV vs med rx.  Pt still resistant to the ides of procedures and seems to be more interested in possible hospice despite my telling her that this is something we might be able to fix.    Tele: AF -130 (pers rev)    Past Medical History:   Diagnosis Date    Hyperlipidemia     Hypertension     Thyroid disease        Past Surgical History:   Procedure Laterality Date    THYROIDECTOMY         Review of patient's allergies indicates:   Allergen Reactions    Codeine Anxiety       No current facility-administered medications on file prior to encounter.      No current outpatient prescriptions on file prior to encounter.     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    SpO2: 95 %  O2 Device (Oxygen Therapy): nasal cannula      Intake/Output Summary (Last 24 hours) at 05/21/17 1352  Last data filed at 05/21/17 1300   Gross per 24 hour   Intake           2370.8 ml   Output             2690 ml   Net           -319.2 ml       Lines/Drains/Airways     Drain                 Urethral Catheter 05/20/17 1019 1 day          Peripheral Intravenous Line                 Peripheral IV - Single Lumen 05/19/17 1033 Left Forearm 2 days         Peripheral IV - Single Lumen 05/19/17 1239 Right Antecubital 2 days                Physical Exam   Constitutional: She is oriented to person, place, and time. She appears well-developed and well-nourished. No distress.   HENT:   Head: Normocephalic and atraumatic.   Eyes: Conjunctivae are normal. Pupils are equal, round, and reactive to light. No scleral icterus.   Neck: Normal range of motion. Neck supple. No JVD present.   Cardiovascular: S1 normal and S2 normal.  An irregularly irregular rhythm present. Tachycardia present.  Exam reveals distant heart sounds.    Pulmonary/Chest: Effort normal and breath sounds normal. No respiratory distress. She has no wheezes.   Abdominal: Soft. Bowel sounds are normal.   obese   Musculoskeletal: Normal range of motion. She exhibits no edema.   Neurological: She is alert and oriented to person, place, and time.   Skin: Skin is warm and dry. She is not diaphoretic.   Psychiatric: She has a normal mood and affect. Her behavior is normal.       Current Medications:   cefTRIAXone (ROCEPHIN) IVPB  1 g Intravenous Q24H    furosemide  20 mg Oral BID    levothyroxine  88 mcg Oral Before breakfast    metoprolol tartrate  25 mg Oral BID    simvastatin  20 mg Oral QHS      amiodarone 0.5 mg/min (05/21/17 1300)    dextrose 5 % and 0.45 % NaCl with KCl 20 mEq 75 mL/hr at 05/21/17 1300     acetaminophen, clonazePAM, dextromethorphan-guaifenesin  mg    Laboratory:  CBC:    Recent Labs  Lab 05/19/17  1050  "05/20/17  0153   WHITE BLOOD CELL COUNT 15.51 H 12.87 H   HEMOGLOBIN 14.2 12.8   HEMATOCRIT 40.9 37.8   PLATELETS 156 142 L       CHEMISTRIES:    Recent Labs  Lab 05/19/17  1050 05/20/17  0153   GLUCOSE 180 H 158 H   SODIUM 133 L 132 L   POTASSIUM 4.6 3.4 L   BUN BLD 37 H 33 H   CREATININE 1.5 H 1.2   EGFR IF  34 A 44 A   EGFR IF NON- 29 A 39 A   CALCIUM 8.5 L 7.4 L   MAGNESIUM 3.1 H  --        CARDIAC BIOMARKERS:    Recent Labs  Lab 05/19/17  1050 05/19/17  1839 05/19/17  2319   TROPONIN I 0.104 H 0.093 H 0.073 H       COAGS:        LIPIDS/LFTS:    Recent Labs  Lab 05/19/17  1050 05/20/17  0153   AST 45 H 121 H   ALT 38 74 H     Lab Results   Component Value Date    TSH 0.388 (L) 05/19/2017     Free T4 1.06 (5/19/17)    Diagnostic Results:  ECG (personally reviewed tracings):   5/19/17 1033 , inflat ST abnl ?isch    Chest X-Ray (personally reviewed image(s)): 5/19/17 NAD    Echo: 5/19/17 (images pers rev)    1 - Normal left ventricular systolic function (EF 60-65%).     2 - No wall motion abnormalities.     3 - Concentric remodeling.     4 - Upper limit of normal aortic root, 3.7 cm.     5 - Trivial aortic regurgitation.     6 - Trivial tricuspid regurgitation.     7 - Pulmonary hypertension. The estimated PA systolic pressure is 42 mmHg.       Assessment and Plan:     * Atrial fibrillation with RVR    AF/RVR persists depsite amio gtt and dig boluses.  BP stable.  EF normal, no sig valve disease noted on echo.  I again offered ROMEO/DCCV and attendant need for OAC, and pt still seems resistant to this.  The other option is hospice and the pt seemed more amenable to the latter option.  I will defer further discussion to Dr. Deutsch (despite my explaining to pt that we might be able to "fix" her arrhythmia and make her feel better).  Stop amio gtt as pt has no wish for rhythm control (i.e. ROMEO/DCCV/OAC)  Titrate metoprolol to 50mg bid (and inc dose as BP will tolerate)  Digoxin left " as a contingency          Elevated troponin    In setting of renal insuff and AF/RVR.  Doubt ACS.  No plan for isch eval given DNR status.  Will plan to titrate BBl and cont statin rx.        Acute renal failure    Improving by labs yesterday        Acute bronchitis    Monitor creat        Essential hypertension, benign    As above.            VTE Risk Mitigation         Ordered     Medium Risk of VTE  Once      05/19/17 1346     Place SHAYLEE hose  Until discontinued      05/19/17 1346     Place sequential compression device  Until discontinued      05/19/17 1346          Homer Suero MD  Cardiology  [facility]

## 2017-05-21 NOTE — PROGRESS NOTES
"Subjective:       Patient ID: Jenny Perales is a 89 y.o. female     LOS: 2 days   89 y.o. y.o.female with history of hypertension, hyperlipidemia and hypothyroidism admitted to [facility] on 05/19/2017 with complaints of confusion and shortness of breath. During initial ER evaluation she was found to be in Atrial fibrillation with RVR. Treatment was initiated and the patient was transferred to ICU.    Patient is feeling better this morning, cough improved. She C/o chest wall discomfort earlier, relieved by Motrin  She remains afebrile.  Family at bedside.      Allergies:      Review of patient's allergies indicates:   Allergen Reactions    Codeine Anxiety       Medications:    Scheduled Meds:   cefTRIAXone (ROCEPHIN) IVPB  1 g Intravenous Q24H    furosemide  20 mg Oral BID    levothyroxine  88 mcg Oral Before breakfast    metoprolol tartrate  25 mg Oral BID    simvastatin  20 mg Oral QHS     Continuous Infusions:   amiodarone 0.5 mg/min (05/21/17 0901)    dextrose 5 % and 0.45 % NaCl with KCl 20 mEq 75 mL/hr at 05/21/17 0902     PRN Meds:.acetaminophen, clonazePAM, dextromethorphan-guaifenesin  mg        Objective:    BP (!) 140/74   Pulse (!) 131   Temp 98 °F (36.7 °C) (Oral)   Resp (!) 26   Ht 5' 1" (1.549 m)   Wt 77.2 kg (170 lb 3.1 oz)   SpO2 96%   Breastfeeding? No   BMI 32.16 kg/m²   Physical Exam   Constitutional:   Alert, oriented, mildly dyspneic   HENT:   Head: Normocephalic and atraumatic.   Nose: Nose normal.   Mouth/Throat: No oropharyngeal exudate.   Decreased hearing   Eyes: EOM are normal. Pupils are equal, round, and reactive to light. No scleral icterus.   Neck: Neck supple. No thyromegaly present.   Cardiovascular: An irregularly irregular rhythm present. Tachycardia present.    Pulmonary/Chest:   Decreased basilar BS   Abdominal: Soft. Bowel sounds are normal. She exhibits no distension and no mass. There is no tenderness.   Musculoskeletal: She " exhibits no edema.   SCDs in place   Neurological:   No focal deficit   Skin: No rash noted.         Labs:     2D echo with color flow doppler    Collection Time: 05/19/17  4:04 PM   Result Value Ref Range    EF 60 55 - 65    Diastolic Dysfunction No     Aortic Valve Regurgitation TRIVIAL     Est. PA Systolic Pressure 41.64 (A)     Pericardial Effusion NONE     Mitral Valve Mobility NORMAL     Tricuspid Valve Regurgitation TRIVIAL    Troponin I    Collection Time: 05/19/17  6:39 PM   Result Value Ref Range    Troponin I 0.093 (H) 0.000 - 0.026 ng/mL   Troponin I    Collection Time: 05/19/17 11:19 PM   Result Value Ref Range    Troponin I 0.073 (H) 0.000 - 0.026 ng/mL   CBC auto differential    Collection Time: 05/20/17  1:53 AM   Result Value Ref Range    WBC 12.87 (H) 3.90 - 12.70 K/uL    RBC 4.09 4.00 - 5.40 M/uL    Hemoglobin 12.8 12.0 - 16.0 g/dL    Hematocrit 37.8 37.0 - 48.5 %    MCV 92 82 - 98 fL    MCH 31.3 (H) 27.0 - 31.0 pg    MCHC 33.9 32.0 - 36.0 %    RDW 14.7 (H) 11.5 - 14.5 %    Platelets 142 (L) 150 - 350 K/uL    MPV 11.7 9.2 - 12.9 fL    Gran # 10.7 (H) 1.8 - 7.7 K/uL    Lymph # 0.7 (L) 1.0 - 4.8 K/uL    Mono # 1.5 (H) 0.3 - 1.0 K/uL    Eos # 0.0 0.0 - 0.5 K/uL    Baso # 0.02 0.00 - 0.20 K/uL    Gran% 82.8 (H) 38.0 - 73.0 %    Lymph% 5.3 (L) 18.0 - 48.0 %    Mono% 11.6 4.0 - 15.0 %    Eosinophil% 0.1 0.0 - 8.0 %    Basophil% 0.2 0.0 - 1.9 %    Differential Method Automated    Comprehensive metabolic panel    Collection Time: 05/20/17  1:53 AM   Result Value Ref Range    Sodium 132 (L) 136 - 145 mmol/L    Potassium 3.4 (L) 3.5 - 5.1 mmol/L    Chloride 97 95 - 110 mmol/L    CO2 24 23 - 29 mmol/L    Glucose 158 (H) 70 - 110 mg/dL    BUN, Bld 33 (H) 10 - 30 mg/dL    Creatinine 1.2 0.5 - 1.4 mg/dL    Calcium 7.4 (L) 8.7 - 10.5 mg/dL    Total Protein 6.2 6.0 - 8.4 g/dL    Albumin 2.2 (L) 3.5 - 5.2 g/dL    Total Bilirubin 1.4 (H) 0.1 - 1.0 mg/dL    Alkaline Phosphatase 101 55 - 135 U/L     (H) 10 -  40 U/L    ALT 74 (H) 10 - 44 U/L    Anion Gap 11 8 - 16 mmol/L    eGFR if African American 44 (A) >60 mL/min/1.73 m^2    eGFR if non African American 39 (A) >60 mL/min/1.73 m^2     X-Ray Chest AP Portable  5/20/201714:56  Impression   Increased right pleural effusion with atelectasis of the right lower lung zone.  Otherwise unchanged when compared to prior exam.  Stable cardiomegaly.  Calcification of the aortic arch.  Prominence of the pulmonary vasculature.  Stable patchy opacity scattered throughout the lungs bilaterally.  Stable ill-defined opacity projecting over the right middle lung zone.  Consider CT for further evaluation.  Degenerative changes of the thoracic spine.      Intake/Output Summary (Last 24 hours) at 05/21/17 1004  Last data filed at 05/21/17 0722   Gross per 24 hour   Intake             2004 ml   Output             3640 ml   Net            -1636 ml        Assessment:      *Atrial fibrillation with RVR [I48.91]  Yes    Opacity of right lung on chest x-ray [R91.8]  Unknown    Essential hypertension, benign [I10]  Yes    Atrophy of thyroid [E03.4]  Yes    Mixed hyperlipidemia [E78.2]  Yes    Acute bronchitis [J20.9]  Yes    Acute renal failure [N17.9]  Yes    Elevated troponin [R74.8]  Yes       Plan:   Diagnoses and treatment plan discussed again with patient, patient's daughter ans son in law, including cardiology recommendations (ROMEO/CV  Vs meds), she remains reluctant and wants to go home with hospice  Continue current management   consulted for discharge planning   She will return to [facility] with Hospice tomorrow if bed available  Case discussed with Nursing staff

## 2017-05-21 NOTE — ASSESSMENT & PLAN NOTE
"AF/RVR persists depsite amio gtt and dig boluses.  BP stable.  EF normal, no sig valve disease noted on echo.  I again offered ROMEO/DCCV and attendant need for OAC, and pt still seems resistant to this.  The other option is hospice and the pt seemed more amenable to the latter option.  I will defer further discussion to Dr. Deutsch (despite my explaining to pt that we might be able to "fix" her arrhythmia and make her feel better).  Stop amio gtt as pt has no wish for rhythm control (i.e. ROMEO/DCCV/OAC)  Titrate metoprolol to 50mg bid (and inc dose as BP will tolerate)  Digoxin left as a contingency    "

## 2017-05-21 NOTE — HOSPITAL COURSE
5/22/17: ROMEO with VEL thrombus, DCCV aborted.    Interval hx:  Pt seen in ICU, case d/w daughter at bedside.  No cp/sob.  Pt's main goal is to get home.  She does not want any more procedures at present, and I explained that none were planned at this time.    Tele: AF RVR 's (pers rev)

## 2017-05-21 NOTE — ASSESSMENT & PLAN NOTE
In setting of renal insuff and AF/RVR.  Doubt ACS.  No plan for isch eval given DNR status.  Will plan to titrate BBl and cont statin rx.

## 2017-05-21 NOTE — PLAN OF CARE
Problem: Patient Care Overview  Goal: Plan of Care Review  Outcome: Ongoing (interventions implemented as appropriate)  Pt remains in ICU AAOx4, NAD, on 4L O2 NC. Pt being repositioned Q2, skin being monitored for breakdown, none noted. Abreu intact with adequate output. No new injuries noted, will cont to monitor.

## 2017-05-22 ENCOUNTER — SURGERY (OUTPATIENT)
Age: 82
End: 2017-05-22

## 2017-05-22 ENCOUNTER — ANESTHESIA (OUTPATIENT)
Dept: CARDIOLOGY | Facility: HOSPITAL | Age: 82
DRG: 872 | End: 2017-05-22
Payer: MEDICARE

## 2017-05-22 ENCOUNTER — ANESTHESIA EVENT (OUTPATIENT)
Dept: CARDIOLOGY | Facility: HOSPITAL | Age: 82
DRG: 872 | End: 2017-05-22
Payer: MEDICARE

## 2017-05-22 VITALS — RESPIRATION RATE: 29 BRPM

## 2017-05-22 LAB
ANION GAP SERPL CALC-SCNC: 8 MMOL/L
AORTIC VALVE REGURGITATION: NORMAL
BASOPHILS # BLD AUTO: 0.02 K/UL
BASOPHILS NFR BLD: 0.2 %
BUN SERPL-MCNC: 9 MG/DL
CALCIUM SERPL-MCNC: 7.1 MG/DL
CHLORIDE SERPL-SCNC: 93 MMOL/L
CO2 SERPL-SCNC: 30 MMOL/L
CREAT SERPL-MCNC: 0.8 MG/DL
DIASTOLIC DYSFUNCTION: NO
DIFFERENTIAL METHOD: ABNORMAL
EOSINOPHIL # BLD AUTO: 0 K/UL
EOSINOPHIL NFR BLD: 0.4 %
ERYTHROCYTE [DISTWIDTH] IN BLOOD BY AUTOMATED COUNT: 14.6 %
EST. GFR  (AFRICAN AMERICAN): >60 ML/MIN/1.73 M^2
EST. GFR  (NON AFRICAN AMERICAN): >60 ML/MIN/1.73 M^2
GLUCOSE SERPL-MCNC: 143 MG/DL
HCT VFR BLD AUTO: 41.5 %
HGB BLD-MCNC: 13.7 G/DL
LYMPHOCYTES # BLD AUTO: 1 K/UL
LYMPHOCYTES NFR BLD: 9.6 %
MCH RBC QN AUTO: 30 PG
MCHC RBC AUTO-ENTMCNC: 33 %
MCV RBC AUTO: 91 FL
MITRAL VALVE MOBILITY: NORMAL
MITRAL VALVE REGURGITATION: NORMAL
MONOCYTES # BLD AUTO: 0.9 K/UL
MONOCYTES NFR BLD: 8.4 %
NEUTROPHILS # BLD AUTO: 8.4 K/UL
NEUTROPHILS NFR BLD: 81.4 %
PLATELET # BLD AUTO: 228 K/UL
PMV BLD AUTO: 11.5 FL
POTASSIUM SERPL-SCNC: 3.4 MMOL/L
RBC # BLD AUTO: 4.56 M/UL
RETIRED EF AND QEF - SEE NOTES: 50 (ref 55–65)
SODIUM SERPL-SCNC: 131 MMOL/L
TRICUSPID VALVE REGURGITATION: NORMAL
WBC # BLD AUTO: 10.32 K/UL

## 2017-05-22 PROCEDURE — 93312 ECHO TRANSESOPHAGEAL: CPT

## 2017-05-22 PROCEDURE — 63600175 PHARM REV CODE 636 W HCPCS: Performed by: INTERNAL MEDICINE

## 2017-05-22 PROCEDURE — D9220A PRA ANESTHESIA: Mod: CRNA,,, | Performed by: NURSE ANESTHETIST, CERTIFIED REGISTERED

## 2017-05-22 PROCEDURE — B24BZZ4 ULTRASONOGRAPHY OF HEART WITH AORTA, TRANSESOPHAGEAL: ICD-10-PCS | Performed by: INTERNAL MEDICINE

## 2017-05-22 PROCEDURE — 93325 DOPPLER ECHO COLOR FLOW MAPG: CPT

## 2017-05-22 PROCEDURE — 27000221 HC OXYGEN, UP TO 24 HOURS

## 2017-05-22 PROCEDURE — 37000008 HC ANESTHESIA 1ST 15 MINUTES: Performed by: INTERNAL MEDICINE

## 2017-05-22 PROCEDURE — 25000003 PHARM REV CODE 250

## 2017-05-22 PROCEDURE — 93312 ECHO TRANSESOPHAGEAL: CPT | Mod: 26,,, | Performed by: INTERNAL MEDICINE

## 2017-05-22 PROCEDURE — 93325 DOPPLER ECHO COLOR FLOW MAPG: CPT | Mod: 26,,, | Performed by: INTERNAL MEDICINE

## 2017-05-22 PROCEDURE — 80048 BASIC METABOLIC PNL TOTAL CA: CPT

## 2017-05-22 PROCEDURE — 25000003 PHARM REV CODE 250: Performed by: INTERNAL MEDICINE

## 2017-05-22 PROCEDURE — 93320 DOPPLER ECHO COMPLETE: CPT | Mod: 26,,, | Performed by: INTERNAL MEDICINE

## 2017-05-22 PROCEDURE — 20000000 HC ICU ROOM

## 2017-05-22 PROCEDURE — 25000003 PHARM REV CODE 250: Performed by: NURSE ANESTHETIST, CERTIFIED REGISTERED

## 2017-05-22 PROCEDURE — 25000003 PHARM REV CODE 250: Performed by: EMERGENCY MEDICINE

## 2017-05-22 PROCEDURE — 85025 COMPLETE CBC W/AUTO DIFF WBC: CPT

## 2017-05-22 PROCEDURE — 36415 COLL VENOUS BLD VENIPUNCTURE: CPT

## 2017-05-22 PROCEDURE — D9220A PRA ANESTHESIA: Mod: ANES,,, | Performed by: ANESTHESIOLOGY

## 2017-05-22 PROCEDURE — 94761 N-INVAS EAR/PLS OXIMETRY MLT: CPT

## 2017-05-22 PROCEDURE — 37000009 HC ANESTHESIA EA ADD 15 MINS: Performed by: INTERNAL MEDICINE

## 2017-05-22 PROCEDURE — 63600175 PHARM REV CODE 636 W HCPCS: Performed by: NURSE ANESTHETIST, CERTIFIED REGISTERED

## 2017-05-22 PROCEDURE — 99291 CRITICAL CARE FIRST HOUR: CPT | Mod: ,,, | Performed by: INTERNAL MEDICINE

## 2017-05-22 RX ORDER — DIGOXIN 125 MCG
0.12 TABLET ORAL EVERY 6 HOURS
Status: COMPLETED | OUTPATIENT
Start: 2017-05-22 | End: 2017-05-23

## 2017-05-22 RX ORDER — DIGOXIN 125 MCG
0.12 TABLET ORAL DAILY
Status: DISCONTINUED | OUTPATIENT
Start: 2017-05-23 | End: 2017-05-24

## 2017-05-22 RX ORDER — ENOXAPARIN SODIUM 100 MG/ML
1 INJECTION SUBCUTANEOUS ONCE
Status: COMPLETED | OUTPATIENT
Start: 2017-05-22 | End: 2017-05-22

## 2017-05-22 RX ORDER — LEVOTHYROXINE SODIUM 75 UG/1
75 TABLET ORAL
Status: DISCONTINUED | OUTPATIENT
Start: 2017-05-23 | End: 2017-05-24 | Stop reason: HOSPADM

## 2017-05-22 RX ORDER — LIDOCAINE HCL/PF 100 MG/5ML
SYRINGE (ML) INTRAVENOUS
Status: DISCONTINUED | OUTPATIENT
Start: 2017-05-22 | End: 2017-05-22

## 2017-05-22 RX ORDER — PHENYLEPHRINE HYDROCHLORIDE 10 MG/ML
INJECTION INTRAVENOUS
Status: DISCONTINUED | OUTPATIENT
Start: 2017-05-22 | End: 2017-05-22

## 2017-05-22 RX ORDER — LIDOCAINE HYDROCHLORIDE 20 MG/ML
INJECTION, SOLUTION EPIDURAL; INFILTRATION; INTRACAUDAL; PERINEURAL
Status: DISPENSED
Start: 2017-05-22 | End: 2017-05-22

## 2017-05-22 RX ORDER — PROPOFOL 10 MG/ML
INJECTION, EMULSION INTRAVENOUS
Status: DISPENSED
Start: 2017-05-22 | End: 2017-05-22

## 2017-05-22 RX ORDER — PROPOFOL 10 MG/ML
VIAL (ML) INTRAVENOUS
Status: DISCONTINUED | OUTPATIENT
Start: 2017-05-22 | End: 2017-05-22

## 2017-05-22 RX ORDER — SODIUM CHLORIDE, SODIUM LACTATE, POTASSIUM CHLORIDE, CALCIUM CHLORIDE 600; 310; 30; 20 MG/100ML; MG/100ML; MG/100ML; MG/100ML
INJECTION, SOLUTION INTRAVENOUS CONTINUOUS PRN
Status: DISCONTINUED | OUTPATIENT
Start: 2017-05-22 | End: 2017-05-22

## 2017-05-22 RX ORDER — PHENYLEPHRINE HCL IN 0.9% NACL 1 MG/10 ML
SYRINGE (ML) INTRAVENOUS
Status: DISPENSED
Start: 2017-05-22 | End: 2017-05-22

## 2017-05-22 RX ORDER — METOPROLOL TARTRATE 50 MG/1
100 TABLET ORAL 2 TIMES DAILY
Status: DISCONTINUED | OUTPATIENT
Start: 2017-05-22 | End: 2017-05-23

## 2017-05-22 RX ADMIN — PROPOFOL 50 MG: 10 INJECTION, EMULSION INTRAVENOUS at 10:05

## 2017-05-22 RX ADMIN — LEVOTHYROXINE SODIUM 88 MCG: 88 TABLET ORAL at 05:05

## 2017-05-22 RX ADMIN — SODIUM CHLORIDE, SODIUM LACTATE, POTASSIUM CHLORIDE, AND CALCIUM CHLORIDE: .6; .31; .03; .02 INJECTION, SOLUTION INTRAVENOUS at 10:05

## 2017-05-22 RX ADMIN — ACETAMINOPHEN 650 MG: 325 TABLET, FILM COATED ORAL at 08:05

## 2017-05-22 RX ADMIN — METOPROLOL TARTRATE 100 MG: 50 TABLET ORAL at 08:05

## 2017-05-22 RX ADMIN — CLONAZEPAM 0.5 MG: 0.5 TABLET ORAL at 12:05

## 2017-05-22 RX ADMIN — PHENYLEPHRINE HYDROCHLORIDE 100 MCG: 10 INJECTION INTRAVENOUS at 10:05

## 2017-05-22 RX ADMIN — DIGOXIN 0.12 MG: 0.12 TABLET ORAL at 07:05

## 2017-05-22 RX ADMIN — DEXTROSE MONOHYDRATE, SODIUM CHLORIDE, AND POTASSIUM CHLORIDE: 50; 4.5; 1.49 INJECTION, SOLUTION INTRAVENOUS at 11:05

## 2017-05-22 RX ADMIN — SIMVASTATIN 20 MG: 10 TABLET, FILM COATED ORAL at 09:05

## 2017-05-22 RX ADMIN — AMIODARONE HYDROCHLORIDE 0.5 MG/MIN: 1.8 INJECTION, SOLUTION INTRAVENOUS at 07:05

## 2017-05-22 RX ADMIN — DIGOXIN 0.12 MG: 0.12 TABLET ORAL at 11:05

## 2017-05-22 RX ADMIN — PROPOFOL 20 MG: 10 INJECTION, EMULSION INTRAVENOUS at 10:05

## 2017-05-22 RX ADMIN — AMIODARONE HYDROCHLORIDE 1 MG/MIN: 1.8 INJECTION, SOLUTION INTRAVENOUS at 11:05

## 2017-05-22 RX ADMIN — FUROSEMIDE 20 MG: 20 TABLET ORAL at 07:05

## 2017-05-22 RX ADMIN — LIDOCAINE HYDROCHLORIDE 100 MG: 20 INJECTION, SOLUTION INTRAVENOUS at 10:05

## 2017-05-22 RX ADMIN — AMIODARONE HYDROCHLORIDE 0.5 MG/MIN: 1.8 INJECTION, SOLUTION INTRAVENOUS at 06:05

## 2017-05-22 RX ADMIN — AMIODARONE HYDROCHLORIDE 150 MG: 1.5 INJECTION, SOLUTION INTRAVENOUS at 11:05

## 2017-05-22 RX ADMIN — ASPIRIN 81 MG: 81 TABLET, COATED ORAL at 08:05

## 2017-05-22 RX ADMIN — METOPROLOL TARTRATE 100 MG: 50 TABLET ORAL at 09:05

## 2017-05-22 RX ADMIN — FUROSEMIDE 20 MG: 20 TABLET ORAL at 08:05

## 2017-05-22 RX ADMIN — ENOXAPARIN SODIUM 80 MG: 100 INJECTION SUBCUTANEOUS at 09:05

## 2017-05-22 RX ADMIN — RIVAROXABAN 15 MG: 15 TABLET, FILM COATED ORAL at 05:05

## 2017-05-22 RX ADMIN — CEFTRIAXONE 1 G: 1 INJECTION, SOLUTION INTRAVENOUS at 01:05

## 2017-05-22 NOTE — HOSPITAL COURSE
Dr. Suero has been seeing pt.  He has discussed therapeutic options.  Initially, declined ROMEO for further investigation, DC cardioversion for treatment, or anticoag for future stroke prevention.  She has been talking about hospice.  However, after talking with some of her relatives, she has relented and decided to proceed with ROMEO and cardioversion - hopefully this will give her more comfort.

## 2017-05-22 NOTE — SUBJECTIVE & OBJECTIVE
Past Medical History:   Diagnosis Date    Hyperlipidemia     Hypertension     Thyroid disease        Past Surgical History:   Procedure Laterality Date    THYROIDECTOMY         Review of patient's allergies indicates:   Allergen Reactions    Codeine Anxiety       No current facility-administered medications on file prior to encounter.      No current outpatient prescriptions on file prior to encounter.     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    SpO2: (!) 94 %  O2 Device (Oxygen Therapy): nasal cannula      Intake/Output Summary (Last 24 hours) at 05/22/17 0753  Last data filed at 05/22/17 0600   Gross per 24 hour   Intake          3221.34 ml   Output             3750 ml   Net          -528.66 ml       Lines/Drains/Airways     Drain                 Urethral Catheter 05/20/17 1019 1 day          Peripheral Intravenous Line                 Peripheral IV - Single Lumen 05/19/17 1033 Left Forearm 2 days         Peripheral IV - Single Lumen 05/19/17 1239 Right Antecubital 2 days                Physical Exam   Constitutional: She is oriented to person, place, and time. She appears well-developed and well-nourished. No distress.    HENT:   Head: Normocephalic and atraumatic.   Eyes: Conjunctivae are normal. Pupils are equal, round, and reactive to light. No scleral icterus.   Neck: Normal range of motion. Neck supple. No JVD present.   Cardiovascular: S1 normal and S2 normal.  An irregularly irregular rhythm present. Tachycardia present.  Exam reveals distant heart sounds.    Pulmonary/Chest: Effort normal and breath sounds normal. No respiratory distress. She has no wheezes.   Abdominal: Soft. Bowel sounds are normal.   obese   Musculoskeletal: Normal range of motion. She exhibits no edema.   Neurological: She is alert and oriented to person, place, and time.   Skin: Skin is warm and dry. She is not diaphoretic.   Psychiatric: She has a normal mood and affect. Her behavior is normal.       Current Medications:   aspirin  81 mg Oral Daily    cefTRIAXone (ROCEPHIN) IVPB  1 g Intravenous Q24H    enoxaparin  1 mg/kg Subcutaneous Once    furosemide  20 mg Oral BID    [START ON 5/23/2017] levothyroxine  75 mcg Oral Before breakfast    metoprolol tartrate  100 mg Oral BID    rivaroxaban  15 mg Oral Daily with dinner    simvastatin  20 mg Oral QHS      dextrose 5 % and 0.45 % NaCl with KCl 20 mEq 75 mL/hr at 05/22/17 0600     acetaminophen, clonazePAM, dextromethorphan-guaifenesin  mg    Laboratory:  CBC:    Recent Labs  Lab 05/19/17  1050 05/20/17  0153   WHITE BLOOD CELL COUNT 15.51 H 12.87 H   HEMOGLOBIN 14.2 12.8   HEMATOCRIT 40.9 37.8   PLATELETS 156 142 L       CHEMISTRIES:    Recent Labs  Lab 05/19/17  1050 05/20/17  0153   GLUCOSE 180 H 158 H   SODIUM 133 L 132 L   POTASSIUM 4.6 3.4 L   BUN BLD 37 H 33 H   CREATININE 1.5 H 1.2   EGFR IF  34 A 44 A   EGFR IF NON- 29 A 39 A   CALCIUM 8.5 L 7.4 L   MAGNESIUM 3.1 H  --        CARDIAC BIOMARKERS:    Recent Labs  Lab 05/19/17  1050 05/19/17  1839 05/19/17  2319   TROPONIN I 0.104 H 0.093 H 0.073 H       COAGS:        LIPIDS/LFTS:    Recent Labs  Lab  05/19/17  1050 05/20/17  0153   AST 45 H 121 H   ALT 38 74 H     Lab Results   Component Value Date    TSH 0.388 (L) 05/19/2017     Free T4 1.06 (5/19/17)    Diagnostic Results:  ECG (personally reviewed tracings):   5/19/17 1033 , inflat ST abnl ?isch    Chest X-Ray (personally reviewed image(s)): 5/19/17 NAD    Echo: 5/19/17 (images pers rev)    1 - Normal left ventricular systolic function (EF 60-65%).     2 - No wall motion abnormalities.     3 - Concentric remodeling.     4 - Upper limit of normal aortic root, 3.7 cm.     5 - Trivial aortic regurgitation.     6 - Trivial tricuspid regurgitation.     7 - Pulmonary hypertension. The estimated PA systolic pressure is 42 mmHg.

## 2017-05-22 NOTE — PHYSICIAN QUERY
"PT Name: Jenny Perales  MR #: [id number]     Physician Query Form - Documentation Clarification      CDS/: Vero Lopez RN CDI  Contact information: [phone]    This form is a permanent document in the medical record.     Query Date: May 22, 2017    By submitting this query, we are merely seeking further clarification of documentation. Please utilize your independent clinical judgment when addressing the question(s) below.    The Medical record reflects the following:    Supporting Clinical Findings Location in Medical Record      presented to the ED with complaints of confusion and shortness of breath.  Pt reports she has had a cold for a couple of weeks.  When a friend saw her today she was confused and they brought her here.      Pulmonary/Chest: Effort normal. She has rhonchi in the right lower field and the left lower field.     Sepsis  Will add rocephin and add IVF    New query process - query only visible/workable on epic screen as side/bottom item.  Unable to enlarge/open in new window to see in full screen mode.      Acute bronchitis  Recheck x-ray in the morning       H&P   WBC 15.51*  HGB 14.2  HCT 40.9      ER Vital signs  Sats 84% - 89% up to 98%  Admit Temp 99.9 F    Ceftriaxone 1 gram IV every 24 hours 5/19      ER Notes Lab 5/19          ER Notes        MAR                                                                            Doctor, Please specify diagnosis or diagnoses associated with above clinical findings.    Doctor, Please specify the cause of "Sepsis"    Provider Use Only     [   x] Acute Bronchitis   [    ] Other, please specify___________________   [    ] Unable to determine                                                                                                                           [  ] Clinically undetermined            "

## 2017-05-22 NOTE — BRIEF OP NOTE
[facility]  Brief Operative Note     SUMMARY     Surgery Date: 5/22/2017     Surgeon(s) and Role:     * Homer Suero MD - Primary    Assisting Surgeon: None    Pre-op Diagnosis:  Atrial fibrillation with RVR [I48.91]    Post-op Diagnosis:  Post-Op Diagnosis Codes:     * Atrial fibrillation with RVR [I48.91]    Procedure(s) (LRB):  TRANSESOPHAGEAL ECHOCARDIOGRAM (ROMEO) (N/A)    Anesthesia: General/MAC    Description of the findings of the procedure:   ROMEO noted VEL thrombus  DCCV aborted    Findings/Key Components:   LVEF 50%  Mild MR/AI/TR  No significant valve stenosis  Thrombus noted in VEL  DCCV aborted    Plan:  Amio gtt  Dig load  Cont metoprolol  Cont Xarelto (renal dose) for 4 weeks with repeat ROMEO/DCCV thereafter if HR still elevated.    Estimated Blood Loss: none         Specimens: None    Diet: cardiac    Activity: ad sudeep.

## 2017-05-22 NOTE — SUBJECTIVE & OBJECTIVE
Interval History: remains uncomfortable due to afib  Rate has gone as high as 150s    Review of Systems   Uncomfortable positioning in bed  Some dyspnea    Objective:     Vital Signs (Most Recent):  Temp: 97.6 °F (36.4 °C) (05/22/17 0310)  Pulse: (!) 131 (05/22/17 1000)  Resp: (!) 21 (05/22/17 1000)  BP: 126/73 (05/22/17 1000)  SpO2: 95 % (05/22/17 1000) Vital Signs (24h Range):  Temp:  [97.4 °F (36.3 °C)-98.2 °F (36.8 °C)] 97.6 °F (36.4 °C)  Pulse:  [116-156] 131  Resp:  [20-30] 21  SpO2:  [92 %-97 %] 95 %  BP: ()/(62-97) 126/73     Weight: 84.4 kg (186 lb 1.1 oz)  Body mass index is 35.16 kg/m².    Intake/Output Summary (Last 24 hours) at 05/22/17 1019  Last data filed at 05/22/17 1000   Gross per 24 hour   Intake          3066.24 ml   Output             3775 ml   Net          -708.76 ml      Physical Exam  Frail elderly WF in ICU  She is being maneuvered to position her for cardioversion; this is making her feel uncomfortable  Lungs - coarse bilaterally  Heart tachy irregular  abd soft    She has been living independently for some time.  Family lives out of town.  She is DNR, but is proceeding with therapy in hopes of attaining comfort after resolution of afib

## 2017-05-22 NOTE — PROGRESS NOTES
[facility] Hospital Medicine  Progress Note    Patient Name: Jenny Perales  MRN: [id number]  Patient Class: IP- Inpatient   Admission Date: 5/19/2017  Length of Stay: 3 days  Attending Physician: Gail Deutsch MD  Primary Care Provider: Gail Deutsch MD        Subjective:     Principal Problem:Atrial fibrillation with RVR    HPI:  89yo with        history of dysrhythmia, HTN and increased lipids who presented to the ED with complaints of confusion and shortness of breath.  Pt reported she has had  Cold symptoms for a couple of weeks.  When a friend saw her on day of admit she was confused and they brought her here.  ED found her to be in Atrial fib with RVR.  Pt was started on diltiazem drip in the ED with better control of her rate but still not controlled.  Pt was admitted to the ICU for rate control and cardiology consult       Hospital Course:  Dr. Suero has been seeing pt.  He has discussed therapeutic options.  Initially, declined ROMEO for further investigation, DC cardioversion for treatment, or anticoag for future stroke prevention.  She has been talking about hospice.  However, after talking with some of her relatives, she has relented and decided to proceed with ROMEO and cardioversion - hopefully this will give her more comfort.    Interval History: remains uncomfortable due to afib  Rate has gone as high as 150s    Review of Systems   Uncomfortable positioning in bed  Some dyspnea    Objective:     Vital Signs (Most Recent):  Temp: 97.6 °F (36.4 °C) (05/22/17 0310)  Pulse: (!) 131 (05/22/17 1000)  Resp: (!) 21 (05/22/17 1000)  BP: 126/73 (05/22/17 1000)  SpO2: 95 % (05/22/17 1000) Vital Signs (24h Range):  Temp:  [97.4 °F (36.3 °C)-98.2 °F (36.8 °C)] 97.6 °F (36.4 °C)  Pulse:  [116-156] 131  Resp:  [20-30] 21  SpO2:  [92 %-97 %] 95 %  BP: ()/(62-97) 126/73     Weight: 84.4 kg (186 lb 1.1 oz)  Body mass index is 35.16 kg/m².    Intake/Output Summary (Last 24 hours) at  05/22/17 1019  Last data filed at 05/22/17 1000   Gross per 24 hour   Intake          3066.24 ml   Output             3775 ml   Net          -708.76 ml      Physical Exam  Frail elderly WF in ICU  She is being maneuvered to position her for cardioversion; this is making her feel uncomfortable  Lungs - coarse bilaterally  Heart tachy irregular  abd soft    She has been living independently for some time.  Family lives out of town.  She is DNR, but is proceeding with therapy in hopes of attaining comfort after resolution of afib      Assessment/Plan:      Opacity of right lung on chest x-ray    R effusion  R lower atelectasis  R middle ill-defined opacity  On ceftriaxone  Repeat cxr today          Elevated troponin    Card Follows  Dropped from 0.093 to 0.073        Acute renal failure    Receiving IVF  GFr has climbed from 29 to 39          Acute bronchitis    URI symptoms prior to admit  cxr on 5/20:  Increased right pleural effusion with atelectasis of the right lower lung zone.  Otherwise unchanged when compared to prior exam.  Stable cardiomegaly.  Calcification of the aortic arch.  Prominence of the pulmonary vasculature.  Stable patchy opacity scattered throughout the lungs bilaterally.  Stable ill-defined opacity projecting over the right middle lung zone.  Consider CT for further evaluation.  Degenerative changes of the thoracic spine.  ON empiric ceftriaxone          Sepsis    Initial wbc 15K  Now on rocephin - down to 12K  Afebrile          Mixed hyperlipidemia    Continue meds - simva 20        Atrophy of thyroid    TSH 0.38 (low) on 88 mcg levo.  Drop to 75  This may be playing role in Afib          Essential hypertension, benign    Continue home meds for now  146/67        * Atrial fibrillation with RVR    Pt admitted to the ICU on diltiazem drip.  Cardiology has been consulted as well.  Pt is a DNR.  Dr. Suero is discussing options of ROMEO, Cardioversion, and anticoag - pt has now agreed on ROMEO and  cardioversion; to proceed shortly.              VTE Risk Mitigation         Ordered     rivaroxaban tablet 15 mg  With dinner     Route:  Oral        05/22/17 0748     Medium Risk of VTE  Once      05/19/17 1346     Place SHAYLEE hose  Until discontinued      05/19/17 1346     Place sequential compression device  Until discontinued      05/19/17 1346          Maulik Valentin MD  Department of Hospital Medicine   [facility]

## 2017-05-22 NOTE — ASSESSMENT & PLAN NOTE
AF/RVR persists.  BP stable.  EF normal, no sig valve disease noted on echo.  Pt now amenable to ROMEO/DCCV and attendant need for OAC.  Titrate metoprolol to 100mg bid (and inc dose as BP will tolerate)  Amio/Digoxin left as a contingency.    Risks, benefits and alternatives of the ROMEO/Cardioversion procedure were discussed with the patient and her daughetr and son-in-law in attendance, including throat irritation, aspiration, anesthetic complications, esophageal irritation/perforation, skin irritation, arrhythmia, etc.  Patient understands and agrees to proceed.

## 2017-05-22 NOTE — PHYSICIAN QUERY
PT Name: Jenny Perales  MR #: [id number]    Physician Query Form - Atrial Fibrillation Specificity     CDS/: Vero Lopez RN CDI Contact information: [phone]     This form is a permanent document in the medical record.     Query Date: May 22, 2017    By submitting this query, we are merely seeking further clarification of documentation. Please utilize your independent clinical judgment when addressing the question(s) below.    The medical record contains the following:   Indicators     Supporting Clinical Findings Location in Medical Record   X Atrial Fibrillation Principal Problem:Atrial fibrillation with RVR  History of A FIb H&P    EKG results     X Medication Cardizem drip 5/19  metroprolol tartrate 100 mg bid 5/22  metroprolol tartrate 25 mg bid  5/20  metroprolol tartrate 50 mg bid 5/21  Amiodarone drip 5/19 - 5/21 ER MD Note  MAR   X Treatment Change dilt to amio gtt  Stop amlod  Hold atenolol given borderline hemodynamics (and not a good long term choice given her renal insuff)  Digozin as a contingency  Check echo  Check TSH  Will further discuss ROMEO/DCCV and need for OAC with pt in am (she will contemplate for now).  DNR noted. Cardiology Consult Note 5/19 301 pm   X Other Her rate has been over 200 previously and she needed to be cardioverted.  Cardiology Consult note 5/19 301 pm       Provider, please further specify the Atrial Fibrillation diagnosis.    [  ] Chronic  [ x ] Paroxysmal  [  ] Permanent  [  ] Persistent  [  ] Other (please specify): ____________________________  [  ] Clinically Undetermined    Please document in your progress notes daily for the duration of treatment until resolved, and include in your discharge summary.

## 2017-05-22 NOTE — ANESTHESIA PREPROCEDURE EVALUATION
05/22/2017  Jenny Perales is a 89 y.o., female.    Anesthesia Evaluation    I have reviewed the Patient Summary Reports.     I have reviewed the Medications.     Review of Systems  Anesthesia Hx:  No problems with previous Anesthesia    Social:  Former Smoker, No Alcohol Use    Cardiovascular:   Hypertension Dysrhythmias atrial fibrillation    Renal/:   Chronic Renal Disease, ARF        Physical Exam  General:  Well nourished    Airway/Jaw/Neck:  Airway Findings: Mouth Opening: Normal Tongue: Normal  General Airway Assessment: Adult  Mallampati: II  TM Distance: Normal, at least 6 cm  Jaw/Neck Findings:  Neck ROM: Normal ROM      Dental:  Dental Findings: In tact   Chest/Lungs:  Chest/Lungs Findings: Clear to auscultation, Normal Respiratory Rate     Heart/Vascular:  Heart Findings: Rate: Normal        Mental Status:  Mental Status Findings:  Cooperative, Alert and Oriented         Anesthesia Plan  Type of Anesthesia, risks & benefits discussed:  Anesthesia Type:  general  Patient's Preference:   Intra-op Monitoring Plan: standard ASA monitors  Intra-op Monitoring Plan Comments:   Post Op Pain Control Plan:   Post Op Pain Control Plan Comments:   Induction:   IV  Beta Blocker:  Patient is on a Beta-Blocker and has received one dose within the past 24 hours (No further documentation required).       Informed Consent: Patient understands risks and agrees with Anesthesia plan.  Questions answered. Anesthesia consent signed with patient.  ASA Score: 3     Day of Surgery Review of History & Physical:    H&P update referred to the surgeon.         Ready For Surgery From Anesthesia Perspective.

## 2017-05-22 NOTE — CONSULTS
Hospice: patient currently with AMS (baseline alert, oriented, cooperative). SW met in room with patient, daughter Argelia Rojas and son in law Hany Rojas. They came her from [place] to be with her when patient become ill on Friday (5/19).    Patient apparently requested hospice on 5/20, declined ROMEO on same day. Since that time, she had discussed benefit of ROMEO for comfort with family members, decided to have procedure. She was unable to proceed with today due to VEL thrombus. Patient remains on amiodarone gtt. At some point today patient mental status, cooperation changed. Daughter distraught over patient mental status changes. Son in law coping, attempting to comfort patient.   Patient son and daughter in law from [facility] will be here tomorrow.   SW reviewed some hospice options more with family than patient however did attempt to include patient to extent that she appeared to be able.   One option may be to return to her home (independent apartment at [facility]) with home hospice. Another option may be to admit to [facility] if she needs much help, have hospice to assist with her care there.   SW explained their are many hospice companies that can come to patient home, that some contract with [facility]. As exact plan difficult to determine at this time, SW will contact [facility], determine which come to the facility. SW will also call [facility]/obtain PASSR to help prepare. As family/patient ready, will provide updated information.   SW provide daughter contact information. SW will update demographics with family contacts (only has friend listed).   SW will follow in ICU and assist as needed.   1:25 left voice mail for Crystal Gonzalez at [facility] [phone] to request information regarding hospice companies that contract with the facility.   4:30 pm--Crystal called back, will make accomidations for patient at facility if needed. They only contract [facility] at the nursing [place].  Patient can use any company she desires at her independent apartment and can hire anyone to help that she desires. Updated family

## 2017-05-22 NOTE — ASSESSMENT & PLAN NOTE
URI symptoms prior to admit  cxr on 5/20:  Increased right pleural effusion with atelectasis of the right lower lung zone.  Otherwise unchanged when compared to prior exam.  Stable cardiomegaly.  Calcification of the aortic arch.  Prominence of the pulmonary vasculature.  Stable patchy opacity scattered throughout the lungs bilaterally.  Stable ill-defined opacity projecting over the right middle lung zone.  Consider CT for further evaluation.  Degenerative changes of the thoracic spine.  ON empiric ceftriaxone

## 2017-05-22 NOTE — HPI
89yo with        history of dysrhythmia, HTN and increased lipids who presented to the ED with complaints of confusion and shortness of breath.  Pt reported she has had  Cold symptoms for a couple of weeks.  When a friend saw her on day of admit she was confused and they brought her here.  ED found her to be in Atrial fib with RVR.  Pt was started on diltiazem drip in the ED with better control of her rate but still not controlled.  Pt was admitted to the ICU for rate control and cardiology consult

## 2017-05-22 NOTE — ANESTHESIA POSTPROCEDURE EVALUATION
"Anesthesia Post Evaluation    Patient: Jenny Mendezuley    Procedure(s) Performed: Procedure(s) (LRB):  TRANSESOPHAGEAL ECHOCARDIOGRAM (ROMEO) (N/A)    Final Anesthesia Type: general  Patient location during evaluation: PACU  Patient participation: Yes- Able to Participate  Level of consciousness: awake and alert and oriented  Post-procedure vital signs: reviewed and stable  Pain management: adequate  Airway patency: patent  PONV status at discharge: No PONV  Anesthetic complications: no      Cardiovascular status: blood pressure returned to baseline and hemodynamically stable  Respiratory status: unassisted, spontaneous ventilation and room air  Hydration status: euvolemic  Follow-up not needed.        Visit Vitals  BP (!) 111/55 (BP Location: Right arm, Patient Position: Lying, BP Method: Automatic)   Pulse (!) 125   Temp 36.9 °C (98.4 °F) (Oral)   Resp 20   Ht 5' 1" (1.549 m)   Wt 84.4 kg (186 lb 1.1 oz)   SpO2 95%   Breastfeeding? No   BMI 35.16 kg/m²       Pain/Deandre Score: Pain Assessment Performed: Yes (5/22/2017 10:00 AM)  Presence of Pain: denies (5/22/2017 10:00 AM)  Pain Rating Prior to Med Admin: 4 (5/22/2017  8:09 AM)  Pain Rating Post Med Admin: 0 (5/21/2017 10:05 AM)      "

## 2017-05-22 NOTE — PROGRESS NOTES
[facility]  Cardiology  Progress Note    Patient Name: Jenny Perales  MRN: [id number]  Admission Date: 5/19/2017  Hospital Length of Stay: 3 days  Code Status: DNR   Attending Physician: Gail Deutsch MD   Primary Care Physician: Gail Deutsch MD  Expected Discharge Date:   Principal Problem:Atrial fibrillation with RVR    Subjective:     Hospital Course:   Interval hx:  Pt seen in ICU, case d/w RN.  Daughter and son in law at bedside.  Pt also discussed case with her other daughter in law (who is an anesthesiologist) and now appears amenable to ROMEO/DCCV and attendant need for OAC.  No cp, appears less winded.    Tele: AF -160's (pers rev)    Past Medical History:   Diagnosis Date    Hyperlipidemia     Hypertension     Thyroid disease        Past Surgical History:   Procedure Laterality Date    THYROIDECTOMY         Review of patient's allergies indicates:   Allergen Reactions    Codeine Anxiety       No current facility-administered medications on file prior to encounter.      No current outpatient prescriptions on file prior to encounter.     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    SpO2:  (!) 94 %  O2 Device (Oxygen Therapy): nasal cannula      Intake/Output Summary (Last 24 hours) at 05/22/17 0753  Last data filed at 05/22/17 0600   Gross per 24 hour   Intake          3221.34 ml   Output             3750 ml   Net          -528.66 ml       Lines/Drains/Airways     Drain                 Urethral Catheter 05/20/17 1019 1 day          Peripheral Intravenous Line                 Peripheral IV - Single Lumen 05/19/17 1033 Left Forearm 2 days         Peripheral IV - Single Lumen 05/19/17 1239 Right Antecubital 2 days                Physical Exam   Constitutional: She is oriented to person, place, and time. She appears well-developed and well-nourished. No distress.   HENT:   Head: Normocephalic and atraumatic.   Eyes: Conjunctivae are normal. Pupils are equal, round, and reactive to light. No scleral icterus.   Neck: Normal range of motion. Neck supple. No JVD present.   Cardiovascular: S1 normal and S2 normal.  An irregularly irregular rhythm present. Tachycardia present.  Exam reveals distant heart sounds.    Pulmonary/Chest: Effort normal and breath sounds normal. No respiratory distress. She has no wheezes.   Abdominal: Soft. Bowel sounds are normal.   obese   Musculoskeletal: Normal range of motion. She exhibits no edema.   Neurological: She is alert and oriented to person, place, and time.   Skin: Skin is warm and dry. She is not diaphoretic.   Psychiatric: She has a normal mood and affect. Her behavior is normal.       Current Medications:   aspirin  81 mg Oral Daily    cefTRIAXone (ROCEPHIN) IVPB  1 g Intravenous Q24H    enoxaparin  1 mg/kg Subcutaneous Once    furosemide  20 mg Oral BID    [START ON 5/23/2017] levothyroxine  75 mcg Oral Before breakfast    metoprolol tartrate  100 mg Oral BID    rivaroxaban  15 mg Oral Daily with dinner    simvastatin  20 mg Oral QHS      dextrose 5 % and 0.45 % NaCl with KCl 20 mEq 75 mL/hr at 05/22/17 0600     acetaminophen, clonazePAM,  dextromethorphan-guaifenesin  mg    Laboratory:  CBC:    Recent Labs  Lab 05/19/17  1050 05/20/17  0153   WHITE BLOOD CELL COUNT 15.51 H 12.87 H   HEMOGLOBIN 14.2 12.8   HEMATOCRIT 40.9 37.8   PLATELETS 156 142 L       CHEMISTRIES:    Recent Labs  Lab 05/19/17  1050 05/20/17  0153   GLUCOSE 180 H 158 H   SODIUM 133 L 132 L   POTASSIUM 4.6 3.4 L   BUN BLD 37 H 33 H   CREATININE 1.5 H 1.2   EGFR IF  34 A 44 A   EGFR IF NON- 29 A 39 A   CALCIUM 8.5 L 7.4 L   MAGNESIUM 3.1 H  --        CARDIAC BIOMARKERS:    Recent Labs  Lab 05/19/17  1050 05/19/17  1839 05/19/17  2319   TROPONIN I 0.104 H 0.093 H 0.073 H       COAGS:        LIPIDS/LFTS:    Recent Labs  Lab 05/19/17  1050 05/20/17  0153   AST 45 H 121 H   ALT 38 74 H     Lab Results   Component Value Date    TSH 0.388 (L) 05/19/2017     Free T4 1.06 (5/19/17)    Diagnostic Results:  ECG (personally reviewed tracings):   5/19/17 1033 , inflat ST abnl ?isch    Chest X-Ray (personally reviewed image(s)): 5/19/17 NAD    Echo: 5/19/17 (images pers rev)    1 - Normal left ventricular systolic function (EF 60-65%).     2 - No wall motion abnormalities.     3 - Concentric remodeling.     4 - Upper limit of normal aortic root, 3.7 cm.     5 - Trivial aortic regurgitation.     6 - Trivial tricuspid regurgitation.     7 - Pulmonary hypertension. The estimated PA systolic pressure is 42 mmHg.       Assessment and Plan:     * Atrial fibrillation with RVR    AF/RVR persists.  BP stable.  EF normal, no sig valve disease noted on echo.  Pt now amenable to ROMEO/DCCV and attendant need for OAC.  Titrate metoprolol to 100mg bid (and inc dose as BP will tolerate)  Amio/Digoxin left as a contingency.    Risks, benefits and alternatives of the ROMEO/Cardioversion procedure were discussed with the patient and her daughetr and son-in-law in attendance, including throat irritation, aspiration, anesthetic complications, esophageal irritation/perforation,  skin irritation, arrhythmia, etc.  Patient understands and agrees to proceed.            Elevated troponin    In setting of renal insuff and AF/RVR.  Doubt ACS.  No plan for isch eval given DNR status.  Will plan to titrate BBl and cont statin rx.        Acute renal failure    Labs pending today.        Acute bronchitis    Monitor creat        Essential hypertension, benign    As above.            VTE Risk Mitigation         Ordered     rivaroxaban tablet 15 mg  With dinner     Route:  Oral        05/22/17 0748     Medium Risk of VTE  Once      05/19/17 1346     Place SHAYLEE hose  Until discontinued      05/19/17 1346     Place sequential compression device  Until discontinued      05/19/17 1346        Critical care time 35 min (including extensive discussion of case with family at bedside and pros/cons of ROMEO/DCCV).    Homer Suero MD  Cardiology  [facility]

## 2017-05-22 NOTE — ASSESSMENT & PLAN NOTE
Pt admitted to the ICU on diltiazem drip.  Cardiology has been consulted as well.  Pt is a DNR.  Dr. Suero is discussing options of ROMEO, Cardioversion, and anticoag - pt has now agreed on ROMEO and cardioversion; to proceed shortly.

## 2017-05-22 NOTE — PLAN OF CARE
Problem: Patient Care Overview  Goal: Plan of Care Review  Outcome: Ongoing (interventions implemented as appropriate)  Pt remains IN ICU on 4L O2 NC, VSS, NAD. Pt being repositioned Q2, skin being monitored for breakdown. No new injuries reported, will cont to monitor.

## 2017-05-23 PROBLEM — R91.8 OPACITY OF LUNG ON IMAGING STUDY: Status: ACTIVE | Noted: 2017-05-23

## 2017-05-23 PROCEDURE — 25000003 PHARM REV CODE 250

## 2017-05-23 PROCEDURE — 25000003 PHARM REV CODE 250: Performed by: INTERNAL MEDICINE

## 2017-05-23 PROCEDURE — 63600175 PHARM REV CODE 636 W HCPCS: Performed by: INTERNAL MEDICINE

## 2017-05-23 PROCEDURE — 94761 N-INVAS EAR/PLS OXIMETRY MLT: CPT

## 2017-05-23 PROCEDURE — 25000003 PHARM REV CODE 250: Performed by: EMERGENCY MEDICINE

## 2017-05-23 PROCEDURE — 99233 SBSQ HOSP IP/OBS HIGH 50: CPT | Mod: ,,, | Performed by: INTERNAL MEDICINE

## 2017-05-23 PROCEDURE — 20000000 HC ICU ROOM

## 2017-05-23 PROCEDURE — 27000221 HC OXYGEN, UP TO 24 HOURS

## 2017-05-23 RX ORDER — CLONAZEPAM 0.5 MG/1
0.5 TABLET ORAL EVERY 8 HOURS PRN
Status: DISCONTINUED | OUTPATIENT
Start: 2017-05-23 | End: 2017-05-24 | Stop reason: HOSPADM

## 2017-05-23 RX ORDER — AMIODARONE HYDROCHLORIDE 200 MG/1
200 TABLET ORAL 2 TIMES DAILY
Status: DISCONTINUED | OUTPATIENT
Start: 2017-05-30 | End: 2017-05-24 | Stop reason: HOSPADM

## 2017-05-23 RX ORDER — METOPROLOL TARTRATE 50 MG/1
50 TABLET ORAL ONCE
Status: COMPLETED | OUTPATIENT
Start: 2017-05-23 | End: 2017-05-23

## 2017-05-23 RX ORDER — METOPROLOL TARTRATE 50 MG/1
150 TABLET ORAL 2 TIMES DAILY
Status: DISCONTINUED | OUTPATIENT
Start: 2017-05-23 | End: 2017-05-24

## 2017-05-23 RX ORDER — AMIODARONE HYDROCHLORIDE 200 MG/1
200 TABLET ORAL DAILY
Status: DISCONTINUED | OUTPATIENT
Start: 2017-06-06 | End: 2017-05-24 | Stop reason: HOSPADM

## 2017-05-23 RX ORDER — AMIODARONE HYDROCHLORIDE 200 MG/1
400 TABLET ORAL 2 TIMES DAILY
Status: DISCONTINUED | OUTPATIENT
Start: 2017-05-23 | End: 2017-05-24 | Stop reason: HOSPADM

## 2017-05-23 RX ADMIN — METOPROLOL TARTRATE 50 MG: 50 TABLET ORAL at 10:05

## 2017-05-23 RX ADMIN — ASPIRIN 81 MG: 81 TABLET, COATED ORAL at 08:05

## 2017-05-23 RX ADMIN — AMIODARONE HYDROCHLORIDE 400 MG: 200 TABLET ORAL at 08:05

## 2017-05-23 RX ADMIN — SIMVASTATIN 20 MG: 10 TABLET, FILM COATED ORAL at 08:05

## 2017-05-23 RX ADMIN — AMIODARONE HYDROCHLORIDE 0.5 MG/MIN: 1.8 INJECTION, SOLUTION INTRAVENOUS at 04:05

## 2017-05-23 RX ADMIN — DEXTROSE MONOHYDRATE, SODIUM CHLORIDE, AND POTASSIUM CHLORIDE: 50; 4.5; 1.49 INJECTION, SOLUTION INTRAVENOUS at 04:05

## 2017-05-23 RX ADMIN — ACETAMINOPHEN 650 MG: 325 TABLET, FILM COATED ORAL at 02:05

## 2017-05-23 RX ADMIN — RIVAROXABAN 15 MG: 15 TABLET, FILM COATED ORAL at 05:05

## 2017-05-23 RX ADMIN — AMIODARONE HYDROCHLORIDE 400 MG: 200 TABLET ORAL at 10:05

## 2017-05-23 RX ADMIN — METOPROLOL TARTRATE 100 MG: 50 TABLET ORAL at 08:05

## 2017-05-23 RX ADMIN — FUROSEMIDE 20 MG: 20 TABLET ORAL at 08:05

## 2017-05-23 RX ADMIN — LEVOTHYROXINE SODIUM 75 MCG: 75 TABLET ORAL at 04:05

## 2017-05-23 RX ADMIN — CLONAZEPAM 0.5 MG: 0.5 TABLET ORAL at 11:05

## 2017-05-23 RX ADMIN — DIGOXIN 0.12 MG: 0.12 TABLET ORAL at 12:05

## 2017-05-23 RX ADMIN — CEFTRIAXONE 1 G: 1 INJECTION, SOLUTION INTRAVENOUS at 02:05

## 2017-05-23 RX ADMIN — FUROSEMIDE 20 MG: 20 TABLET ORAL at 05:05

## 2017-05-23 RX ADMIN — METOPROLOL TARTRATE 150 MG: 50 TABLET ORAL at 08:05

## 2017-05-23 RX ADMIN — DEXTROSE MONOHYDRATE, SODIUM CHLORIDE, AND POTASSIUM CHLORIDE: 50; 4.5; 1.49 INJECTION, SOLUTION INTRAVENOUS at 08:05

## 2017-05-23 RX ADMIN — DIGOXIN 0.12 MG: 125 TABLET ORAL at 08:05

## 2017-05-23 RX ADMIN — CLONAZEPAM 0.5 MG: 0.5 TABLET ORAL at 04:05

## 2017-05-23 RX ADMIN — CLONAZEPAM 0.5 MG: 0.5 TABLET ORAL at 10:05

## 2017-05-23 NOTE — PLAN OF CARE
Problem: Patient Care Overview  Goal: Individualization & Mutuality  Pt remains in Afib. Rate varies 100-120 BPM. BP stable. [facility] Eval for hospice completed. No new injury noted.

## 2017-05-23 NOTE — SUBJECTIVE & OBJECTIVE
Past Medical History:   Diagnosis Date    Hyperlipidemia     Hypertension     Thyroid disease        Past Surgical History:   Procedure Laterality Date    THYROIDECTOMY         Review of patient's allergies indicates:   Allergen Reactions    Codeine Anxiety       No current facility-administered medications on file prior to encounter.      No current outpatient prescriptions on file prior to encounter.     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    SpO2: 100 %  O2 Device (Oxygen Therapy): nasal cannula      Intake/Output Summary (Last 24 hours) at 05/23/17 0851  Last data filed at 05/23/17 0800   Gross per 24 hour   Intake           2386.7 ml   Output             2500 ml   Net           -113.3 ml       Lines/Drains/Airways     Drain                 Urethral Catheter 05/20/17 1019 2 days          Airway                 Airway - Non-Surgical 05/22/17 1031 Nasal Cannula less than 1 day          Peripheral Intravenous Line                 Peripheral IV - Single Lumen 05/22/17 2155 Right Forearm less than 1 day         Peripheral IV - Single Lumen 05/22/17 2156 Left Forearm less than 1 day                Physical Exam   Constitutional: She is  oriented to person, place, and time. She appears well-developed and well-nourished. No distress.   HENT:   Head: Normocephalic and atraumatic.   Eyes: Conjunctivae are normal. Pupils are equal, round, and reactive to light. No scleral icterus.   Neck: Normal range of motion. Neck supple. No JVD present.   Cardiovascular: S1 normal and S2 normal.  An irregularly irregular rhythm present. Tachycardia present.  Exam reveals distant heart sounds.    Pulmonary/Chest: Effort normal and breath sounds normal. No respiratory distress. She has no wheezes.   Abdominal: Soft. Bowel sounds are normal.   obese   Musculoskeletal: Normal range of motion. She exhibits no edema.   Neurological: She is alert and oriented to person, place, and time.   presbycusis   Skin: Skin is warm and dry. She is not diaphoretic.   Psychiatric: She has a normal mood and affect. Her behavior is normal.       Current Medications:   aspirin  81 mg Oral Daily    cefTRIAXone (ROCEPHIN) IVPB  1 g Intravenous Q24H    digoxin  0.125 mg Oral Daily    furosemide  20 mg Oral BID    levothyroxine  75 mcg Oral Before breakfast    metoprolol tartrate  100 mg Oral BID    rivaroxaban  15 mg Oral Daily with dinner    simvastatin  20 mg Oral QHS      amiodarone 0.5 mg/min (05/23/17 0800)    dextrose 5 % and 0.45 % NaCl with KCl 20 mEq 75 mL/hr at 05/23/17 0800     acetaminophen, clonazePAM, dextromethorphan-guaifenesin  mg    Laboratory:  CBC:    Recent Labs  Lab 05/19/17  1050 05/20/17  0153 05/22/17  0924   WHITE BLOOD CELL COUNT 15.51 H 12.87 H 10.32   HEMOGLOBIN 14.2 12.8 13.7   HEMATOCRIT 40.9 37.8 41.5   PLATELETS 156 142 L 228       CHEMISTRIES:    Recent Labs  Lab 05/19/17  1050 05/20/17  0153 05/22/17  0924   GLUCOSE 180 H 158 H 143 H   SODIUM 133 L 132 L 131 L   POTASSIUM 4.6 3.4 L 3.4 L   BUN BLD 37 H 33 H 9 L   CREATININE 1.5 H 1.2 0.8   EGFR IF  34 A 44 A >60   EGFR IF NON- 29 A 39 A >60   CALCIUM 8.5 L 7.4  L 7.1 L   MAGNESIUM 3.1 H  --   --        CARDIAC BIOMARKERS:    Recent Labs  Lab 05/19/17  1050 05/19/17  1839 05/19/17  2319   TROPONIN I 0.104 H 0.093 H 0.073 H       COAGS:        LIPIDS/LFTS:    Recent Labs  Lab 05/19/17  1050 05/20/17  0153   AST 45 H 121 H   ALT 38 74 H     Lab Results   Component Value Date    TSH 0.388 (L) 05/19/2017     Free T4 1.06 (5/19/17)    Diagnostic Results:  ECG (personally reviewed tracings):   5/19/17 1033 , inflat ST abnl ?isch    Chest X-Ray (personally reviewed image(s)): 5/19/17 NAD    ROMEO 5/22/17 (images pers rev):    1 - Left atrial appendage is single lobed with spontaneous echo contrast with adherent thrombus.     2 - Low normal to mildly depressed left ventricular systolic function (EF 50-55%).     3 - Mild aortic regurgitation.     4 - Trivial to mild mitral regurgitation.     5 - Trivial to mild tricuspid regurgitation.     6 - DCCV aborted given presence of VEL thrombus.     Echo: 5/19/17 (images pers rev)    1 - Normal left ventricular systolic function (EF 60-65%).     2 - No wall motion abnormalities.     3 - Concentric remodeling.     4 - Upper limit of normal aortic root, 3.7 cm.     5 - Trivial aortic regurgitation.     6 - Trivial tricuspid regurgitation.     7 - Pulmonary hypertension. The estimated PA systolic pressure is 42 mmHg.

## 2017-05-23 NOTE — ASSESSMENT & PLAN NOTE
ROMEO 5/22/17 with VEL thrombus, DCCV aborted.  AF/RVR persists.  BP stable.  EF normal, no sig valve disease noted on echo.  Pt now amenable to ROMEO/DCCV and attendant need for OAC.  Pt now on Xarelto, will increase 20mg qd (non-CRI dose)  Titrate metoprolol to 150mg bid (and inc dose as BP will tolerate).  Change amio gtt to po load  Cont digoxin, check dig level in am  Will attempt rate control/OAC for management of AF (as DCCV not safely performed with VEL thrombus)  Can consider repeat ROMEO/attempt at DCCV in 3-4 weeks

## 2017-05-23 NOTE — PROGRESS NOTES
Discharge plan: patient continues to report desire to return home as soon as possible. Patient and family met with Tomasa Lin with  today. Patient family will be here until June 5, will help her with hiring additional help at home. Patient/family would benefit from  at discharge, if she goes with home health or hospice. Will need to continue assessment/update plan as she progresses. Dr Valentin rounded during SW visit with patient and daughter.  explained the heart rate goals to patient and her daughter. Not likely to discharge tomorrow. Patient expresses disappointment.   SW will continue to follow in ICU and assist as needed.

## 2017-05-23 NOTE — PROGRESS NOTES
[facility]  Cardiology  Progress Note    Patient Name: Jenny Perales  MRN: [id number]  Admission Date: 5/19/2017  Hospital Length of Stay: 4 days  Code Status: DNR   Attending Physician: Gail Deutsch MD   Primary Care Physician: Gail Deutsch MD  Expected Discharge Date:   Principal Problem:Atrial fibrillation with RVR    Subjective:     Hospital Course:   5/22/17: ROMEO with VEL thrombus, DCCV aborted.    Interval hx:  Pt seen in ICU, case d/w RN.  No cp/sob.  Fatigued and wants to go home.  Case d/w daughter and son-in-law at bedside.  Described need to get HR under control prior to discharge.    Tele: AF RVR 's (pers rev)    Past Medical History:   Diagnosis Date    Hyperlipidemia     Hypertension     Thyroid disease        Past Surgical History:   Procedure Laterality Date    THYROIDECTOMY         Review of patient's allergies indicates:   Allergen Reactions    Codeine Anxiety       No current facility-administered medications on file prior to encounter.      No current outpatient prescriptions on file prior to encounter.     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    SpO2: 100 %  O2 Device (Oxygen  Therapy): nasal cannula      Intake/Output Summary (Last 24 hours) at 05/23/17 0851  Last data filed at 05/23/17 0800   Gross per 24 hour   Intake           2386.7 ml   Output             2500 ml   Net           -113.3 ml       Lines/Drains/Airways     Drain                 Urethral Catheter 05/20/17 1019 2 days          Airway                 Airway - Non-Surgical 05/22/17 1031 Nasal Cannula less than 1 day          Peripheral Intravenous Line                 Peripheral IV - Single Lumen 05/22/17 2155 Right Forearm less than 1 day         Peripheral IV - Single Lumen 05/22/17 2156 Left Forearm less than 1 day                Physical Exam   Constitutional: She is oriented to person, place, and time. She appears well-developed and well-nourished. No distress.   HENT:   Head: Normocephalic and atraumatic.   Eyes: Conjunctivae are normal. Pupils are equal, round, and reactive to light. No scleral icterus.   Neck: Normal range of motion. Neck supple. No JVD present.   Cardiovascular: S1 normal and S2 normal.  An irregularly irregular rhythm present. Tachycardia present.  Exam reveals distant heart sounds.    Pulmonary/Chest: Effort normal and breath sounds normal. No respiratory distress. She has no wheezes.   Abdominal: Soft. Bowel sounds are normal.   obese   Musculoskeletal: Normal range of motion. She exhibits no edema.   Neurological: She is alert and oriented to person, place, and time.   presbycusis   Skin: Skin is warm and dry. She is not diaphoretic.   Psychiatric: She has a normal mood and affect. Her behavior is normal.       Current Medications:   aspirin  81 mg Oral Daily    cefTRIAXone (ROCEPHIN) IVPB  1 g Intravenous Q24H    digoxin  0.125 mg Oral Daily    furosemide  20 mg Oral BID    levothyroxine  75 mcg Oral Before breakfast    metoprolol tartrate  100 mg Oral BID    rivaroxaban  15 mg Oral Daily with dinner    simvastatin  20 mg Oral QHS      amiodarone 0.5 mg/min (05/23/17 0800)     dextrose 5 % and 0.45 % NaCl with KCl 20 mEq 75 mL/hr at 05/23/17 0800     acetaminophen, clonazePAM, dextromethorphan-guaifenesin  mg    Laboratory:  CBC:    Recent Labs  Lab 05/19/17  1050 05/20/17  0153 05/22/17  0924   WHITE BLOOD CELL COUNT 15.51 H 12.87 H 10.32   HEMOGLOBIN 14.2 12.8 13.7   HEMATOCRIT 40.9 37.8 41.5   PLATELETS 156 142 L 228       CHEMISTRIES:    Recent Labs  Lab 05/19/17  1050 05/20/17  0153 05/22/17  0924   GLUCOSE 180 H 158 H 143 H   SODIUM 133 L 132 L 131 L   POTASSIUM 4.6 3.4 L 3.4 L   BUN BLD 37 H 33 H 9 L   CREATININE 1.5 H 1.2 0.8   EGFR IF  34 A 44 A >60   EGFR IF NON- 29 A 39 A >60   CALCIUM 8.5 L 7.4 L 7.1 L   MAGNESIUM 3.1 H  --   --        CARDIAC BIOMARKERS:    Recent Labs  Lab 05/19/17  1050 05/19/17  1839 05/19/17  2319   TROPONIN I 0.104 H 0.093 H 0.073 H       COAGS:        LIPIDS/LFTS:    Recent Labs  Lab 05/19/17  1050 05/20/17  0153   AST 45 H 121 H   ALT 38 74 H     Lab Results   Component Value Date    TSH 0.388 (L) 05/19/2017     Free T4 1.06 (5/19/17)    Diagnostic Results:  ECG (personally reviewed tracings):   5/19/17 1033 , inflat ST abnl ?isch    Chest X-Ray (personally reviewed image(s)): 5/19/17 NAD    ROMEO 5/22/17 (images pers rev):    1 - Left atrial appendage is single lobed with spontaneous echo contrast with adherent thrombus.     2 - Low normal to mildly depressed left ventricular systolic function (EF 50-55%).     3 - Mild aortic regurgitation.     4 - Trivial to mild mitral regurgitation.     5 - Trivial to mild tricuspid regurgitation.     6 - DCCV aborted given presence of VEL thrombus.     Echo: 5/19/17 (images pers rev)    1 - Normal left ventricular systolic function (EF 60-65%).     2 - No wall motion abnormalities.     3 - Concentric remodeling.     4 - Upper limit of normal aortic root, 3.7 cm.     5 - Trivial aortic regurgitation.     6 - Trivial tricuspid regurgitation.     7 - Pulmonary hypertension.  The estimated PA systolic pressure is 42 mmHg.       Assessment and Plan:     * Atrial fibrillation with RVR    ROMEO 5/22/17 with VEL thrombus, DCCV aborted.  AF/RVR persists.  BP stable.  EF normal, no sig valve disease noted on echo.  Pt now amenable to ROMEO/DCCV and attendant need for OAC.  Pt now on Xarelto, will increase 20mg qd (non-CRI dose)  Titrate metoprolol to 150mg bid (and inc dose as BP will tolerate).  Change amio gtt to po load  Cont digoxin, check dig level in am  Will attempt rate control/OAC for management of AF (as DCCV not safely performed with VEL thrombus)  Can consider repeat ROMEO/attempt at DCCV in 3-4 weeks            Acute renal failure    Resolved  Monitor Creat        Elevated troponin    In setting of renal insuff and AF/RVR.  Doubt ACS.  No plan for isch eval given DNR status.  Will plan to titrate BBl and cont statin rx.        Essential hypertension, benign    As above.        Acute bronchitis    Per IM            VTE Risk Mitigation         Ordered     rivaroxaban tablet 15 mg  With dinner     Route:  Oral        05/22/17 0748     Medium Risk of VTE  Once      05/19/17 1346     Place SHAYLEE hose  Until discontinued      05/19/17 1346     Place sequential compression device  Until discontinued      05/19/17 1346          Homer Suero MD  Cardiology  [facility] [place]

## 2017-05-23 NOTE — ASSESSMENT & PLAN NOTE
Pt admitted to the ICU on diltiazem drip.  Cardiology has been consulted as well.  Pt is a DNR.  Dr. Suero is discussing options of ROMEO, Cardioversion, and anticoag - pt has now agreed on ROMEO and cardioversion; ROMEO findings:  LVEF 50%  Mild MR/AI/TR  No significant valve stenosis  Thrombus noted in VEL  DCCV aborted  Plan:  Amio gtt  Dig load  Cont metoprolol  Cont Xarelto (renal dose) for 4 weeks with repeat ROMEO/DCCV thereafter if HR still elevated.

## 2017-05-23 NOTE — ASSESSMENT & PLAN NOTE
Results 5/22: The cardiac silhouette is prominent.  The pulmonary vascularity may be increased as centrally.  I suspect bilateral small pleural effusion left more than right.  Round opacity noted in the right upper lung zone, similar to the prior exam could represent an area of airspace disease, atelectasis, scarring or neoplasm.  Apical scarring noted bilaterally.  Atherosclerotic changes of the aorta.  Age-related degenerative changes of the osseous structures.  This is felt to be not significantly changed from previous

## 2017-05-23 NOTE — PLAN OF CARE
05/23/17 1745   Medicare Message   Important Message from Medicare regarding Discharge Appeal Rights Given to patient/caregiver;Explained to patient/caregiver;Signed/date by patient/caregiver   daughter completed at request of patient. Copy provided.

## 2017-05-24 VITALS
TEMPERATURE: 98 F | BODY MASS INDEX: 33.3 KG/M2 | OXYGEN SATURATION: 100 % | RESPIRATION RATE: 32 BRPM | SYSTOLIC BLOOD PRESSURE: 140 MMHG | WEIGHT: 176.38 LBS | DIASTOLIC BLOOD PRESSURE: 103 MMHG | HEIGHT: 61 IN | HEART RATE: 101 BPM

## 2017-05-24 LAB
ANION GAP SERPL CALC-SCNC: 8 MMOL/L
BASOPHILS # BLD AUTO: 0.02 K/UL
BASOPHILS NFR BLD: 0.3 %
BUN SERPL-MCNC: 7 MG/DL
CALCIUM SERPL-MCNC: 6.7 MG/DL
CHLORIDE SERPL-SCNC: 92 MMOL/L
CO2 SERPL-SCNC: 33 MMOL/L
CREAT SERPL-MCNC: 0.8 MG/DL
DIFFERENTIAL METHOD: ABNORMAL
DIGOXIN SERPL-MCNC: 0.5 NG/ML
EOSINOPHIL # BLD AUTO: 0.1 K/UL
EOSINOPHIL NFR BLD: 1.6 %
ERYTHROCYTE [DISTWIDTH] IN BLOOD BY AUTOMATED COUNT: 14.7 %
EST. GFR  (AFRICAN AMERICAN): >60 ML/MIN/1.73 M^2
EST. GFR  (NON AFRICAN AMERICAN): >60 ML/MIN/1.73 M^2
GLUCOSE SERPL-MCNC: 110 MG/DL
HCT VFR BLD AUTO: 41.4 %
HGB BLD-MCNC: 14 G/DL
LYMPHOCYTES # BLD AUTO: 1 K/UL
LYMPHOCYTES NFR BLD: 13.2 %
MCH RBC QN AUTO: 30.9 PG
MCHC RBC AUTO-ENTMCNC: 33.8 %
MCV RBC AUTO: 91 FL
MONOCYTES # BLD AUTO: 0.9 K/UL
MONOCYTES NFR BLD: 11.6 %
NEUTROPHILS # BLD AUTO: 5.4 K/UL
NEUTROPHILS NFR BLD: 73.3 %
PLATELET # BLD AUTO: 277 K/UL
PMV BLD AUTO: 11.7 FL
POTASSIUM SERPL-SCNC: 4 MMOL/L
RBC # BLD AUTO: 4.53 M/UL
SODIUM SERPL-SCNC: 133 MMOL/L
WBC # BLD AUTO: 7.42 K/UL

## 2017-05-24 PROCEDURE — 25000003 PHARM REV CODE 250: Performed by: INTERNAL MEDICINE

## 2017-05-24 PROCEDURE — 25000003 PHARM REV CODE 250

## 2017-05-24 PROCEDURE — 36415 COLL VENOUS BLD VENIPUNCTURE: CPT

## 2017-05-24 PROCEDURE — 25000003 PHARM REV CODE 250: Performed by: EMERGENCY MEDICINE

## 2017-05-24 PROCEDURE — 63600175 PHARM REV CODE 636 W HCPCS: Performed by: INTERNAL MEDICINE

## 2017-05-24 PROCEDURE — 80162 ASSAY OF DIGOXIN TOTAL: CPT

## 2017-05-24 PROCEDURE — 80048 BASIC METABOLIC PNL TOTAL CA: CPT

## 2017-05-24 PROCEDURE — 99233 SBSQ HOSP IP/OBS HIGH 50: CPT | Mod: ,,, | Performed by: INTERNAL MEDICINE

## 2017-05-24 PROCEDURE — 85025 COMPLETE CBC W/AUTO DIFF WBC: CPT

## 2017-05-24 RX ORDER — LEVOTHYROXINE SODIUM 75 UG/1
75 TABLET ORAL
Qty: 30 TABLET | Refills: 11 | Status: SHIPPED | OUTPATIENT
Start: 2017-05-24 | End: 2018-05-24

## 2017-05-24 RX ORDER — METOPROLOL TARTRATE 50 MG/1
200 TABLET ORAL 2 TIMES DAILY
Status: DISCONTINUED | OUTPATIENT
Start: 2017-05-24 | End: 2017-05-24 | Stop reason: HOSPADM

## 2017-05-24 RX ORDER — METOPROLOL TARTRATE 100 MG/1
200 TABLET ORAL 2 TIMES DAILY
Qty: 60 TABLET | Refills: 11 | Status: SHIPPED | OUTPATIENT
Start: 2017-05-24 | End: 2018-05-24

## 2017-05-24 RX ORDER — ACETAMINOPHEN 325 MG/1
650 TABLET ORAL EVERY 6 HOURS PRN
Refills: 0
Start: 2017-05-24

## 2017-05-24 RX ORDER — AMIODARONE HYDROCHLORIDE 200 MG/1
200 TABLET ORAL 2 TIMES DAILY
Qty: 14 TABLET | Refills: 0 | Status: SHIPPED | OUTPATIENT
Start: 2017-05-30 | End: 2017-06-06

## 2017-05-24 RX ORDER — AMIODARONE HYDROCHLORIDE 400 MG/1
400 TABLET ORAL 2 TIMES DAILY
Qty: 12 TABLET | Refills: 0 | Status: SHIPPED | OUTPATIENT
Start: 2017-05-24 | End: 2017-05-30

## 2017-05-24 RX ORDER — DIGOXIN 125 MCG
0.25 TABLET ORAL DAILY
Status: DISCONTINUED | OUTPATIENT
Start: 2017-05-25 | End: 2017-05-24 | Stop reason: HOSPADM

## 2017-05-24 RX ORDER — AMIODARONE HYDROCHLORIDE 200 MG/1
200 TABLET ORAL DAILY
Qty: 30 TABLET | Refills: 11 | Status: SHIPPED | OUTPATIENT
Start: 2017-06-06 | End: 2018-06-06

## 2017-05-24 RX ORDER — DIGOXIN 125 MCG
0.12 TABLET ORAL ONCE
Status: COMPLETED | OUTPATIENT
Start: 2017-05-24 | End: 2017-05-24

## 2017-05-24 RX ORDER — DIGOXIN 250 MCG
250 TABLET ORAL DAILY
Qty: 30 TABLET | Refills: 11 | Status: SHIPPED | OUTPATIENT
Start: 2017-05-25 | End: 2018-05-25

## 2017-05-24 RX ADMIN — DIGOXIN 0.12 MG: 125 TABLET ORAL at 10:05

## 2017-05-24 RX ADMIN — CLONAZEPAM 0.5 MG: 0.5 TABLET ORAL at 06:05

## 2017-05-24 RX ADMIN — FUROSEMIDE 20 MG: 20 TABLET ORAL at 10:05

## 2017-05-24 RX ADMIN — LEVOTHYROXINE SODIUM 75 MCG: 75 TABLET ORAL at 05:05

## 2017-05-24 RX ADMIN — DEXTROSE MONOHYDRATE, SODIUM CHLORIDE, AND POTASSIUM CHLORIDE: 50; 4.5; 1.49 INJECTION, SOLUTION INTRAVENOUS at 11:05

## 2017-05-24 RX ADMIN — AMIODARONE HYDROCHLORIDE 400 MG: 200 TABLET ORAL at 10:05

## 2017-05-24 RX ADMIN — CEFTRIAXONE 1 G: 1 INJECTION, SOLUTION INTRAVENOUS at 03:05

## 2017-05-24 RX ADMIN — METOPROLOL TARTRATE 150 MG: 50 TABLET ORAL at 10:05

## 2017-05-24 RX ADMIN — ASPIRIN 81 MG: 81 TABLET, COATED ORAL at 10:05

## 2017-05-24 RX ADMIN — FUROSEMIDE 20 MG: 20 TABLET ORAL at 06:05

## 2017-05-24 RX ADMIN — RIVAROXABAN 20 MG: 20 TABLET, FILM COATED ORAL at 06:05

## 2017-05-24 NOTE — ASSESSMENT & PLAN NOTE
Initial wbc 15K  Now on rocephin - down to 12K then ultimately 7.4  Afebrile  abx dced for discharge home

## 2017-05-24 NOTE — SUBJECTIVE & OBJECTIVE
Past Medical History:   Diagnosis Date    Hyperlipidemia     Hypertension     Thyroid disease        Past Surgical History:   Procedure Laterality Date    THYROIDECTOMY         Review of patient's allergies indicates:   Allergen Reactions    Codeine Anxiety       No current facility-administered medications on file prior to encounter.      No current outpatient prescriptions on file prior to encounter.     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    SpO2: 99 %  O2 Device (Oxygen Therapy): (P) nasal cannula      Intake/Output Summary (Last 24 hours) at 05/24/17 0919  Last data filed at 05/24/17 0705   Gross per 24 hour   Intake           1981.7 ml   Output             1945 ml   Net             36.7 ml       Lines/Drains/Airways     Drain                 Urethral Catheter 05/20/17 1019 3 days          Airway                 Airway - Non-Surgical 05/22/17 1031 Nasal Cannula 1 day          Peripheral Intravenous Line                 Peripheral IV - Single Lumen 05/22/17 2155 Right Forearm 1 day         Peripheral IV - Single Lumen 05/22/17 2156 Left Forearm 1 day                Physical Exam   Constitutional: She is oriented to person, place, and  time. She appears well-developed and well-nourished. No distress.   HENT:   Head: Normocephalic and atraumatic.   Eyes: Conjunctivae are normal. Pupils are equal, round, and reactive to light. No scleral icterus.   Neck: Normal range of motion. Neck supple. No JVD present.   Cardiovascular: S1 normal and S2 normal.  An irregularly irregular rhythm present. Tachycardia present.  Exam reveals distant heart sounds.    Pulmonary/Chest: Effort normal and breath sounds normal. No respiratory distress. She has no wheezes.   Abdominal: Soft. Bowel sounds are normal.   obese   Musculoskeletal: Normal range of motion. She exhibits no edema.   Neurological: She is alert and oriented to person, place, and time.   presbycusis   Skin: Skin is warm and dry. She is not diaphoretic.   Psychiatric: She has a normal mood and affect. Her behavior is normal.       Current Medications:   amiodarone  400 mg Oral BID    Followed by    [START ON 5/30/2017] amiodarone  200 mg Oral BID    Followed by    [START ON 6/6/2017] amiodarone  200 mg Oral Daily    aspirin  81 mg Oral Daily    cefTRIAXone (ROCEPHIN) IVPB  1 g Intravenous Q24H    digoxin  0.125 mg Oral Daily    furosemide  20 mg Oral BID    levothyroxine  75 mcg Oral Before breakfast    metoprolol tartrate  150 mg Oral BID    rivaroxaban  15 mg Oral Daily with dinner    simvastatin  20 mg Oral QHS      dextrose 5 % and 0.45 % NaCl with KCl 20 mEq 75 mL/hr at 05/24/17 0705     acetaminophen, clonazePAM, dextromethorphan-guaifenesin  mg    Laboratory:  CBC:    Recent Labs  Lab 05/20/17  0153 05/22/17  0924 05/24/17  0549   WHITE BLOOD CELL COUNT 12.87 H 10.32 7.42   HEMOGLOBIN 12.8 13.7 14.0   HEMATOCRIT 37.8 41.5 41.4   PLATELETS 142 L 228 277       CHEMISTRIES:    Recent Labs  Lab 05/19/17  1050 05/20/17  0153 05/22/17  0924 05/24/17  0549   GLUCOSE 180 H 158 H 143 H 110   SODIUM 133 L 132 L 131 L 133 L   POTASSIUM 4.6 3.4 L 3.4 L 4.0   BUN BLD 37 H 33 H 9 L 7 L    CREATININE 1.5 H 1.2 0.8 0.8   EGFR IF  34 A 44 A >60 >60   EGFR IF NON- 29 A 39 A >60 >60   CALCIUM 8.5 L 7.4 L 7.1 L 6.7 LL   MAGNESIUM 3.1 H  --   --   --        CARDIAC BIOMARKERS:    Recent Labs  Lab 05/19/17  1050 05/19/17  1839 05/19/17  2319   TROPONIN I 0.104 H 0.093 H 0.073 H       COAGS:        LIPIDS/LFTS:    Recent Labs  Lab 05/19/17  1050 05/20/17  0153   AST 45 H 121 H   ALT 38 74 H     Lab Results   Component Value Date    TSH 0.388 (L) 05/19/2017     Free T4 1.06 (5/19/17)    Lab Results   Component Value Date    DIGOXIN 0.5 (L) 05/24/2017         Diagnostic Results:  ECG (personally reviewed tracings):   5/19/17 1033 , inflat ST abnl ?isch    Chest X-Ray (personally reviewed image(s)): 5/19/17 NAD    ROMEO 5/22/17 (images pers rev):    1 - Left atrial appendage is single lobed with spontaneous echo contrast with adherent thrombus.     2 - Low normal to mildly depressed left ventricular systolic function (EF 50-55%).     3 - Mild aortic regurgitation.     4 - Trivial to mild mitral regurgitation.     5 - Trivial to mild tricuspid regurgitation.     6 - DCCV aborted given presence of VEL thrombus.     Echo: 5/19/17 (images pers rev)    1 - Normal left ventricular systolic function (EF 60-65%).     2 - No wall motion abnormalities.     3 - Concentric remodeling.     4 - Upper limit of normal aortic root, 3.7 cm.     5 - Trivial aortic regurgitation.     6 - Trivial tricuspid regurgitation.     7 - Pulmonary hypertension. The estimated PA systolic pressure is 42 mmHg.

## 2017-05-24 NOTE — ASSESSMENT & PLAN NOTE
ROMEO 5/22/17 with VEL thrombus, DCCV aborted.  AF/RVR persists, HR better controlled now.  BP stable.  EF normal, no sig valve disease noted on echo.  Cont Xarelto 20mg qd  Titrate metoprolol to 200mg bid (and inc dose as BP will tolerate).  Cont amio po load  Inc dig to 250mcg qd, monitor serum level  Will attempt rate control/OAC for management of AF (as DCCV not safely performed with VEL thrombus)  Can consider repeat ROMEO/attempt at DCCV in 3-4 weeks  OK for transfer to tele and dispo planning.

## 2017-05-24 NOTE — ASSESSMENT & PLAN NOTE
Results 5/22: The cardiac silhouette is prominent.  The pulmonary vascularity may be increased as centrally.  I suspect bilateral small pleural effusion left more than right.  Round opacity noted in the right upper lung zone, similar to the prior exam could represent an area of airspace disease, atelectasis, scarring or neoplasm.  Apical scarring noted bilaterally.  Atherosclerotic changes of the aorta.  Age-related degenerative changes of the osseous structures.  This is felt to be not significantly changed from previous  She does not want any further aggressive intervention; is DNR  Simply wants to be kept comfortable

## 2017-05-24 NOTE — ASSESSMENT & PLAN NOTE
URI symptoms prior to admit  cxr on 5/20:  Increased right pleural effusion with atelectasis of the right lower lung zone.  Otherwise unchanged when compared to prior exam.  Stable cardiomegaly.  Calcification of the aortic arch.  Prominence of the pulmonary vasculature.  Stable patchy opacity scattered throughout the lungs bilaterally.  Stable ill-defined opacity projecting over the right middle lung zone.  Consider CT for further evaluation.  Degenerative changes of the thoracic spine.  ON empiric ceftriaxone while here - dc for discharge  Feeling better

## 2017-05-24 NOTE — PROGRESS NOTES
[facility] Hospital Medicine  Progress Note    Patient Name: Jenny Perales  MRN: [id number]  Patient Class: IP- Inpatient   Admission Date: 5/19/2017  Length of Stay: 4 days  Attending Physician: Gail Deutsch MD  Primary Care Provider: Gail Deutsch MD        Subjective:     Principal Problem:Atrial fibrillation with RVR    HPI:  89yo with        history of dysrhythmia, HTN and increased lipids who presented to the ED with complaints of confusion and shortness of breath.  Pt reported she has had  Cold symptoms for a couple of weeks.  When a friend saw her on day of admit she was confused and they brought her here.  ED found her to be in Atrial fib with RVR.  Pt was started on diltiazem drip in the ED with better control of her rate but still not controlled.  Pt was admitted to the ICU for rate control and cardiology consult       Hospital Course:  Dr. Suero has been seeing pt.  He has discussed therapeutic options.  Initially, declined ROMEO for further investigation, DC cardioversion for treatment, or anticoag for future stroke prevention.  She has been talking about hospice.  However, after talking with some of her relatives, she has relented and decided to proceed with ROMEO and cardioversion - hopefully this will give her more comfort.    Interval History: seems a bit better today    Review of Systems   No specific complaints  She is growing eager to return home    Objective:     Vital Signs (Most Recent):  Temp: 98.2 °F (36.8 °C) (05/23/17 1537)  Pulse: (!) 126 (05/23/17 1734)  Resp: (!) 32 (05/23/17 1734)  BP: 136/72 (05/23/17 1703)  SpO2: 98 % (05/23/17 1734) Vital Signs (24h Range):  Temp:  [98.2 °F (36.8 °C)-98.4 °F (36.9 °C)] 98.2 °F (36.8 °C)  Pulse:  [] 126  Resp:  [24-41] 32  SpO2:  [94 %-100 %] 98 %  BP: ()/(58-93) 136/72     Weight: 80 kg (176 lb 5.9 oz)  Body mass index is 33.32 kg/m².    Intake/Output Summary (Last 24 hours) at 05/23/17 9449  Last data filed at  05/23/17 1700   Gross per 24 hour   Intake           2190.5 ml   Output             2465 ml   Net           -274.5 ml      Physical Exam  More awake today  Daughter in room  sats in upper 90s  Pulse 108 - 122  Irregular  Lungs coarse  Very hard of hearing  Legs in SCDs    She is DNR  Has signed [place]  Wants hospice  Growing eager to go home - anticipate DC when heart rate has better control - when ok with cards      Assessment/Plan:      Opacity of right lung on chest x-ray    Results 5/22: The cardiac silhouette is prominent.  The pulmonary vascularity may be increased as centrally.  I suspect bilateral small pleural effusion left more than right.  Round opacity noted in the right upper lung zone, similar to the prior exam could represent an area of airspace disease, atelectasis, scarring or neoplasm.  Apical scarring noted bilaterally.  Atherosclerotic changes of the aorta.  Age-related degenerative changes of the osseous structures.  This is felt to be not significantly changed from previous        Elevated troponin    Card Follows  Dropped from 0.093 to 0.073        Acute renal failure    Receiving IVF  GFr has climbed from 29 to 39 - and now back up over 60          Acute bronchitis    URI symptoms prior to admit  cxr on 5/20:  Increased right pleural effusion with atelectasis of the right lower lung zone.  Otherwise unchanged when compared to prior exam.  Stable cardiomegaly.  Calcification of the aortic arch.  Prominence of the pulmonary vasculature.  Stable patchy opacity scattered throughout the lungs bilaterally.  Stable ill-defined opacity projecting over the right middle lung zone.  Consider CT for further evaluation.  Degenerative changes of the thoracic spine.  ON empiric ceftriaxone          Sepsis    Initial wbc 15K  Now on rocephin - down to 12K  Afebrile          Mixed hyperlipidemia    Continue meds - simva 20        Atrophy of thyroid    TSH 0.38 (low) on 88 mcg levo.  Drop to 75  This may be  playing role in Afib          Essential hypertension, benign    Continue home meds for now  114/71        * Atrial fibrillation with RVR    Pt admitted to the ICU on diltiazem drip.  Cardiology has been consulted as well.  Pt is a DNR.  Dr. Suero is discussing options of ROMEO, Cardioversion, and anticoag - pt has now agreed on ROMEO and cardioversion; ROMEO findings:  LVEF 50%  Mild MR/AI/TR  No significant valve stenosis  Thrombus noted in VEL  DCCV aborted  Plan:  Amio gtt  Dig load  Cont metoprolol  Cont Xarelto (renal dose) for 4 weeks with repeat ROMEO/DCCV thereafter if HR still elevated.            VTE Risk Mitigation         Ordered     rivaroxaban tablet 15 mg  With dinner     Route:  Oral        05/22/17 0748     Medium Risk of VTE  Once      05/19/17 1346     Place SHAYLEE hose  Until discontinued      05/19/17 1346     Place sequential compression device  Until discontinued      05/19/17 1346          Maulik Valentin MD  Department of Hospital Medicine   [facility]

## 2017-05-24 NOTE — PROGRESS NOTES
Discharge plan update: patient goal confirmed with patient and son in law--home with [facility] as soon as can be arranged.  MISBAH spoke with [facility]--Receca-daughter signed admission form for home with hospice yesterday--equipment being delivered this afternoon.   MISBAH spoke with Dr Suero, confirmed this is okay.  MISBAH contacted Dr Valentin, rounding for Dr Deutsch. He will be at the hospital later and write the orders.   MISBAH updated patient and son in law. Will continue to follow in ICU and assist as needed.

## 2017-05-24 NOTE — PLAN OF CARE
05/24/17 1735   Final Note   Assessment Type Final Discharge Note   Discharge Disposition [facility]   Discharge planning education complete? Yes   What phone number can be called within the next 1-3 days to see how you are doing after discharge? [phone]   Hospital Follow Up  Appt(s) scheduled? No  (per hospice)   Discharge plans and expectations educations in teach back method with documentation complete? Yes  (primarily with son)   Offered [facility] -- Bedside Delivery? n/a   Discharge/Hospital Encounter Summary to ([facility]) PCP Yes   Referral to Outpatient Case Management complete? n/a   30 day supply of medicines given at discharge, if documented non-compliance / non-adherence? n/a   Any social issues identified prior to discharge? No   Did you assess the readiness or willingness of the family or caregiver to support self management of care? Yes   Right Care Referral Info   Facility [facility]   MISBAH spoke with Jossy with [facility] thru afternoon, faxed orders and discharge summary. At this time son confirms that O2, all equipment has been delivered at home. DEBBIE Schultz reports spoke with hospice RN Fauzia who will finish patient admission when she arrives at home. Patient usual pharmacy is at the [facility], closed at this time. Some medications will need to be obtained. MISBAH problem solved with son who prefers [facility] [address]. MISBAH contacted pharmacy ([facility]) [phone] who confirms take . DEBBIE Schultz informs this pharmacy is open until 9 pm. Antoine provided the education regarding which RX are needed tonight, which may be held to fill tomorrow at her regular pharmacy (or thru the hospice if appropriate).   Additionally, MISBAH spoke with Crystal, admissions at [facility] (nursing [facility]) to inform patient will go to home, not currently applying at facility. As patient resides on campus, Crystal confirms that facility will assist if patient needs alternate care in future. MISBAH  updated with son.   Updated with DEBBIE Schultz who is currently awaiting ambulance for patient.

## 2017-05-24 NOTE — ASSESSMENT & PLAN NOTE
Pt admitted to the ICU on diltiazem drip.  Cardiology has been consulted as well.  Pt is a DNR.  Dr. Suero is discussing options of ROMEO, Cardioversion, and anticoag - pt has now agreed on ROMEO and cardioversion; ROMEO findings:  LVEF 50%  Mild MR/AI/TR  No significant valve stenosis  Thrombus noted in VEL  DCCV aborted  Plan:  Amio 400 bid for 1 week;  200 bid for 1 week, then 200 daily thereafter  Dig load - 250 mcg daily  Cont metoprolol 200 bid  Cont Xarelto (renal dose) for 4 weeks with repeat ROMEO/DCCV thereafter if HR still elevated.

## 2017-05-24 NOTE — SUBJECTIVE & OBJECTIVE
Interval History: seems a bit better today    Review of Systems   No specific complaints  She is growing eager to return home    Objective:     Vital Signs (Most Recent):  Temp: 98.2 °F (36.8 °C) (05/23/17 1537)  Pulse: (!) 126 (05/23/17 1734)  Resp: (!) 32 (05/23/17 1734)  BP: 136/72 (05/23/17 1703)  SpO2: 98 % (05/23/17 1734) Vital Signs (24h Range):  Temp:  [98.2 °F (36.8 °C)-98.4 °F (36.9 °C)] 98.2 °F (36.8 °C)  Pulse:  [] 126  Resp:  [24-41] 32  SpO2:  [94 %-100 %] 98 %  BP: ()/(58-93) 136/72     Weight: 80 kg (176 lb 5.9 oz)  Body mass index is 33.32 kg/m².    Intake/Output Summary (Last 24 hours) at 05/23/17 1905  Last data filed at 05/23/17 1700   Gross per 24 hour   Intake           2190.5 ml   Output             2465 ml   Net           -274.5 ml      Physical Exam  More awake today  Daughter in room  sats in upper 90s  Pulse 108 - 122  Irregular  Lungs coarse  Very hard of hearing  Legs in SCDs    She is DNR  Has signed [place]  Wants hospice  Growing eager to go home - anticipate DC when heart rate has better control - when ok with cards

## 2017-05-24 NOTE — PLAN OF CARE
05/24/17 1600   Transport Information   Transport Diagnosis AFIB with RVR' VEL thrombus, end stage cardiac disease   Transportation Date 05/24/17   Transported From [facility]    Transported To [address] [place] [address] [address] [place] [address]   Supporting Clinical Information   Unable to sit or travel in a seated position because Bed Confined (unable to ambulate/sit)   Select all that would support previous selection Oxygen administration required   Comment very weak--AFIB   spoke with SW supervisor Maira regarding criteria.  4:26 [facility] set up per Lauren with [facility] for 5:30 .

## 2017-05-24 NOTE — PROGRESS NOTES
[facility]  Cardiology  Progress Note    Patient Name: Jenny Perales  MRN: [id number]  Admission Date: 5/19/2017  Hospital Length of Stay: 5 days  Code Status: DNR   Attending Physician: Gail Deutsch MD   Primary Care Physician: Gail Deutsch MD  Expected Discharge Date:   Principal Problem:Atrial fibrillation with RVR    Subjective:     Hospital Course:   5/22/17: ROMEO with VEL thrombus, DCCV aborted.    Interval hx:  Pt seen in ICU, case d/w daughter at bedside.  No cp/sob.  Pt's main goal is to get home.  She does not want any more procedures at present, and I explained that none were planned at this time.    Tele: AF RVR 's (pers rev)    Past Medical History:   Diagnosis Date    Hyperlipidemia     Hypertension     Thyroid disease        Past Surgical History:   Procedure Laterality Date    THYROIDECTOMY         Review of patient's allergies indicates:   Allergen Reactions    Codeine Anxiety       No current facility-administered medications on file prior to encounter.      No current outpatient prescriptions on file prior to encounter.     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    SpO2: 99 %  O2 Device  (Oxygen Therapy): (P) nasal cannula      Intake/Output Summary (Last 24 hours) at 05/24/17 0919  Last data filed at 05/24/17 0705   Gross per 24 hour   Intake           1981.7 ml   Output             1945 ml   Net             36.7 ml       Lines/Drains/Airways     Drain                 Urethral Catheter 05/20/17 1019 3 days          Airway                 Airway - Non-Surgical 05/22/17 1031 Nasal Cannula 1 day          Peripheral Intravenous Line                 Peripheral IV - Single Lumen 05/22/17 2155 Right Forearm 1 day         Peripheral IV - Single Lumen 05/22/17 2156 Left Forearm 1 day                Physical Exam   Constitutional: She is oriented to person, place, and time. She appears well-developed and well-nourished. No distress.   HENT:   Head: Normocephalic and atraumatic.   Eyes: Conjunctivae are normal. Pupils are equal, round, and reactive to light. No scleral icterus.   Neck: Normal range of motion. Neck supple. No JVD present.   Cardiovascular: S1 normal and S2 normal.  An irregularly irregular rhythm present. Tachycardia present.  Exam reveals distant heart sounds.    Pulmonary/Chest: Effort normal and breath sounds normal. No respiratory distress. She has no wheezes.   Abdominal: Soft. Bowel sounds are normal.   obese   Musculoskeletal: Normal range of motion. She exhibits no edema.   Neurological: She is alert and oriented to person, place, and time.   presbycusis   Skin: Skin is warm and dry. She is not diaphoretic.   Psychiatric: She has a normal mood and affect. Her behavior is normal.       Current Medications:   amiodarone  400 mg Oral BID    Followed by    [START ON 5/30/2017] amiodarone  200 mg Oral BID    Followed by    [START ON 6/6/2017] amiodarone  200 mg Oral Daily    aspirin  81 mg Oral Daily    cefTRIAXone (ROCEPHIN) IVPB  1 g Intravenous Q24H    digoxin  0.125 mg Oral Daily    furosemide  20 mg Oral BID    levothyroxine  75 mcg Oral Before breakfast    metoprolol tartrate   150 mg Oral BID    rivaroxaban  15 mg Oral Daily with dinner    simvastatin  20 mg Oral QHS      dextrose 5 % and 0.45 % NaCl with KCl 20 mEq 75 mL/hr at 05/24/17 0705     acetaminophen, clonazePAM, dextromethorphan-guaifenesin  mg    Laboratory:  CBC:    Recent Labs  Lab 05/20/17  0153 05/22/17  0924 05/24/17  0549   WHITE BLOOD CELL COUNT 12.87 H 10.32 7.42   HEMOGLOBIN 12.8 13.7 14.0   HEMATOCRIT 37.8 41.5 41.4   PLATELETS 142 L 228 277       CHEMISTRIES:    Recent Labs  Lab 05/19/17  1050 05/20/17  0153 05/22/17  0924 05/24/17  0549   GLUCOSE 180 H 158 H 143 H 110   SODIUM 133 L 132 L 131 L 133 L   POTASSIUM 4.6 3.4 L 3.4 L 4.0   BUN BLD 37 H 33 H 9 L 7 L   CREATININE 1.5 H 1.2 0.8 0.8   EGFR IF  34 A 44 A >60 >60   EGFR IF NON- 29 A 39 A >60 >60   CALCIUM 8.5 L 7.4 L 7.1 L 6.7 LL   MAGNESIUM 3.1 H  --   --   --        CARDIAC BIOMARKERS:    Recent Labs  Lab 05/19/17  1050 05/19/17  1839 05/19/17  2319   TROPONIN I 0.104 H 0.093 H 0.073 H       COAGS:        LIPIDS/LFTS:    Recent Labs  Lab 05/19/17  1050 05/20/17  0153   AST 45 H 121 H   ALT 38 74 H     Lab Results   Component Value Date    TSH 0.388 (L) 05/19/2017     Free T4 1.06 (5/19/17)    Lab Results   Component Value Date    DIGOXIN 0.5 (L) 05/24/2017         Diagnostic Results:  ECG (personally reviewed tracings):   5/19/17 1033 , inflat ST abnl ?isch    Chest X-Ray (personally reviewed image(s)): 5/19/17 NAD    ROMEO 5/22/17 (images pers rev):    1 - Left atrial appendage is single lobed with spontaneous echo contrast with adherent thrombus.     2 - Low normal to mildly depressed left ventricular systolic function (EF 50-55%).     3 - Mild aortic regurgitation.     4 - Trivial to mild mitral regurgitation.     5 - Trivial to mild tricuspid regurgitation.     6 - DCCV aborted given presence of VEL thrombus.     Echo: 5/19/17 (images pers rev)    1 - Normal left ventricular systolic function (EF 60-65%).      2 - No wall motion abnormalities.     3 - Concentric remodeling.     4 - Upper limit of normal aortic root, 3.7 cm.     5 - Trivial aortic regurgitation.     6 - Trivial tricuspid regurgitation.     7 - Pulmonary hypertension. The estimated PA systolic pressure is 42 mmHg.       Assessment and Plan:     * Atrial fibrillation with RVR    ROMEO 5/22/17 with VEL thrombus, DCCV aborted.  AF/RVR persists, HR better controlled now.  BP stable.  EF normal, no sig valve disease noted on echo.  Cont Xarelto 20mg qd  Titrate metoprolol to 200mg bid (and inc dose as BP will tolerate).  Cont amio po load  Inc dig to 250mcg qd, monitor serum level  Will attempt rate control/OAC for management of AF (as DCCV not safely performed with VEL thrombus)  Can consider repeat ROMEO/attempt at DCCV in 3-4 weeks  OK for transfer to tele and dispo planning.          Acute renal failure    Resolved  Monitor Creat        Elevated troponin    In setting of renal insuff and AF/RVR.  Doubt ACS.  No plan for isch eval given DNR status.  Will plan to titrate BBl and cont statin rx.        Essential hypertension, benign    As above.        Acute bronchitis    Per IM            VTE Risk Mitigation         Ordered     rivaroxaban tablet 15 mg  With dinner     Route:  Oral        05/22/17 0748     Medium Risk of VTE  Once      05/19/17 1346     Place SHAYLEE hose  Until discontinued      05/19/17 1346     Place sequential compression device  Until discontinued      05/19/17 1346          Homer Suero MD  Cardiology  [facility]

## 2017-05-24 NOTE — DISCHARGE SUMMARY
[facility] Hospital Medicine  Discharge Summary      Patient Name: Jenny Perales  MRN: [id number]  Admission Date: 5/19/2017  Hospital Length of Stay: 5 days  Discharge Date and Time: 5/24/17  Attending Physician: Gail Deutsch MD   Discharging Provider: Maulik Valentin MD  Primary Care Provider: Gail Deutsch MD      HPI:   89yo with        history of dysrhythmia, HTN and increased lipids who presented to the ED with complaints of confusion and shortness of breath.  Pt reported she has had  Cold symptoms for a couple of weeks.  When a friend saw her on day of admit she was confused and they brought her here.  ED found her to be in Atrial fib with RVR.  Pt was started on diltiazem drip in the ED with better control of her rate but still not controlled.  Pt was admitted to the ICU for rate control and cardiology consult       Procedure(s) (LRB):  TRANSESOPHAGEAL ECHOCARDIOGRAM (ROMEO) (N/A)    LVEF 50%  Mild MR/AI/TR  No significant valve stenosis  Thrombus noted in VEL  DCCV aborted    Indwelling Lines/Drains at time of discharge:   Lines/Drains/Airways     Drain                 Urethral Catheter 05/20/17 1019 4 days          Airway                 Airway - Non-Surgical 05/22/17 1031 Nasal Cannula 2 days              Hospital Course:   Dr. Suero has been seeing pt.  He has discussed therapeutic options.  Initially, declined ROMEO for further investigation, DC cardioversion for treatment, or anticoag for future stroke prevention.  She has been talking about hospice.  However, after talking with some of her relatives, she has relented and decided to proceed with ROMEO and cardioversion - hopefully this will give her more comfort.   The ROMEO was done - L atrial appendage clot found.  Pt placed on Xarelto.  No DC cardioversion with clot in place.  She is 89 years old and does not wish to be artificially kept alive.  She has requested DNR - [place] signed.  She has been wishing for hospice -  [facility] consulted.  She will be dc home with them when all arrangements finalized.    Consults:   Consults         Status Ordering Provider     Inpatient consult to Cardiology  Once     Provider:  Homer Suero MD    Completed SANG DICKEY     Inpatient consult to Social Work  Once     Provider:  (Not yet assigned)    GARRICK Zarate          Significant Diagnostic Studies:   Echo:  5/19:  EF 55 - 65 60   Diastolic Dysfunction  No   Aortic Valve Regurgitation  TRIVIAL   Est. PA Systolic Pressure  41.64    Pericardial Effusion  NONE   Mitral Valve Mobility  NORMAL   Tricuspid Valve Regurgitation  TRIVIAL   Resulting Agency  [facility]     CONCLUSIONS     1 - Normal left ventricular systolic function (EF 60-65%).     2 - No wall motion abnormalities.     3 - Concentric remodeling.     4 - Upper limit of normal aortic root, 3.7 cm.     5 - Trivial aortic regurgitation.     6 - Trivial tricuspid regurgitation.     7 - Pulmonary hypertension. The estimated PA systolic pressure is 42 mmHg.     ROMEO:  CONCLUSIONS     1 - Left atrial appendage is single lobed with spontaneous echo contrast with adherent thrombus.     2 - Low normal to mildly depressed left ventricular systolic function (EF 50-55%).     3 - Mild aortic regurgitation.     4 - Trivial to mild mitral regurgitation.     5 - Trivial to mild tricuspid regurgitation.     6 - DCCV aborted given presence of VEL thrombus.     cxr 5/22:  Results: The cardiac silhouette is prominent.  The pulmonary vascularity may be increased as centrally.  I suspect bilateral small pleural effusion left more than right.  Round opacity noted in the right upper lung zone, similar to the prior exam could represent an area of airspace disease, atelectasis, scarring or neoplasm.  Apical scarring noted bilaterally.  Atherosclerotic changes of the aorta.  Age-related degenerative changes of the osseous structures.    Pending Diagnostic Studies:     None        Final Active  Diagnoses:    Diagnosis Date Noted POA    PRINCIPAL PROBLEM:  Atrial fibrillation with RVR [I48.91] 05/19/2017 Yes    Opacity of right lung on chest x-ray [R91.8] 05/23/2017 Yes    Essential hypertension, benign [I10] 05/19/2017 Yes    Atrophy of thyroid [E03.4] 05/19/2017 Yes    Mixed hyperlipidemia [E78.2] 05/19/2017 Yes    Sepsis [A41.9] 05/19/2017 Yes    Acute bronchitis [J20.9] 05/19/2017 Yes    Acute renal failure [N17.9] 05/19/2017 Yes    Elevated troponin [R74.8] 05/19/2017 Yes      Problems Resolved During this Admission:    Diagnosis Date Noted Date Resolved POA      Opacity of right lung on chest x-ray    Results 5/22: The cardiac silhouette is prominent.  The pulmonary vascularity may be increased as centrally.  I suspect bilateral small pleural effusion left more than right.  Round opacity noted in the right upper lung zone, similar to the prior exam could represent an area of airspace disease, atelectasis, scarring or neoplasm.  Apical scarring noted bilaterally.  Atherosclerotic changes of the aorta.  Age-related degenerative changes of the osseous structures.  This is felt to be not significantly changed from previous  She does not want any further aggressive intervention; is DNR  Simply wants to be kept comfortable        Elevated troponin    Card Follows  Dropped from 0.093 to 0.073        Acute renal failure    Receiving IVF  GFr has climbed from 29 to 39 - and now back up over 60          Acute bronchitis    URI symptoms prior to admit  cxr on 5/20:  Increased right pleural effusion with atelectasis of the right lower lung zone.  Otherwise unchanged when compared to prior exam.  Stable cardiomegaly.  Calcification of the aortic arch.  Prominence of the pulmonary vasculature.  Stable patchy opacity scattered throughout the lungs bilaterally.  Stable ill-defined opacity projecting over the right middle lung zone.  Consider CT for further evaluation.  Degenerative changes of the thoracic  spine.  ON empiric ceftriaxone while here - dc for discharge  Feeling better          Sepsis    Initial wbc 15K  Now on rocephin - down to 12K then ultimately 7.4  Afebrile  abx dced for discharge home          Mixed hyperlipidemia    Continue meds - simva 20        Atrophy of thyroid    TSH 0.38 (low) on 88 mcg levo.  Drop to 75  This may be playing role in Afib          Essential hypertension, benign    DC with metoprolol, lisinopril, lasix  114/71        * Atrial fibrillation with RVR    Pt admitted to the ICU on diltiazem drip.  Cardiology has been consulted as well.  Pt is a DNR.  Dr. Suero is discussing options of ROMEO, Cardioversion, and anticoag - pt has now agreed on ROMEO and cardioversion; ROMEO findings:  LVEF 50%  Mild MR/AI/TR  No significant valve stenosis  Thrombus noted in VEL  DCCV aborted  Plan:  Amio 400 bid for 1 week;  200 bid for 1 week, then 200 daily thereafter  Dig load - 250 mcg daily  Cont metoprolol 200 bid  Cont Xarelto (renal dose) for 4 weeks with repeat ROMEO/DCCV thereafter if HR still elevated.              Discharged Condition: good  Diet:  Regular  Activity:  As tolerated    Disposition: Hospice/Home with [facility]    Follow Up:  Follow-up Information     [facility].    Why:  Dr. Deutsch will monitor as OP via Hospice               Patient Instructions:   No discharge procedures on file.  Medications:     Medication List      START taking these medications    acetaminophen 325 MG tablet  Commonly known as:  TYLENOL  Take 2 tablets (650 mg total) by mouth every 6 (six) hours as needed.     * amiodarone 400 MG tablet  Commonly known as:  PACERONE  Take 1 tablet (400 mg total) by mouth 2 (two) times daily. Then stop when  This dose runs out - change to 200 mg bid     * amiodarone 200 MG Tab  Commonly known as:  PACERONE  Take 1 tablet (200 mg total) by mouth 2 (two) times daily. Start this after completion of 400 bid dosing  Start taking on:  5/30/2017     * amiodarone 200 MG  Tab  Commonly known as:  PACERONE  Take 1 tablet (200 mg total) by mouth once daily. Begin this after completion of 200 bid dosing - this is maintenance dose  Start taking on:  6/6/2017     digoxin 250 mcg tablet  Commonly known as:  LANOXIN  Take 1 tablet (250 mcg total) by mouth once daily.  Start taking on:  5/25/2017     metoprolol tartrate 100 MG tablet  Commonly known as:  LOPRESSOR  Take 2 tablets (200 mg total) by mouth 2 (two) times daily.     rivaroxaban 20 mg Tab  Commonly known as:  XARELTO  Take 1 tablet (20 mg total) by mouth daily with dinner or evening meal.        * This list has 3 medication(s) that are the same as other medications prescribed for you. Read the directions carefully, and ask your doctor or other care provider to review them with you.            CHANGE how you take these medications    amlodipine 2.5 MG tablet  Commonly known as:  NORVASC  What changed:  Another medication with the same name was removed. Continue taking this medication, and follow the directions you see here.     levothyroxine 75 MCG tablet  Commonly known as:  SYNTHROID  Take 1 tablet (75 mcg total) by mouth before breakfast.  What changed:   medication strength   how much to take   when to take this        CONTINUE taking these medications    clonazePAM 0.5 MG tablet  Commonly known as:  KLONOPIN     furosemide 20 MG tablet  Commonly known as:  LASIX     lisinopril 20 MG tablet  Commonly known as:  PRINIVIL,ZESTRIL     simvastatin 20 MG tablet  Commonly known as:  ZOCOR        STOP taking these medications    atenolol 50 MG tablet  Commonly known as:  TENORMIN           Where to Get Your Medications      You can get these medications from any pharmacy    Bring a paper prescription for each of these medications   amiodarone 200 MG Tab   amiodarone 200 MG Tab   amiodarone 400 MG tablet   digoxin 250 mcg tablet   levothyroxine 75 MCG tablet   metoprolol tartrate 100 MG tablet   rivaroxaban 20 mg Tab     Information  about where to get these medications is not yet available    Ask your nurse or doctor about these medications   acetaminophen 325 MG tablet           Maulik Valentin MD  Department of Hospital Medicine  [facility]

## 2017-05-24 NOTE — PROGRESS NOTES
[facility]    WRITTEN HEALTHCARE and DISCHARGE INFORMATION   FROM YOUR CARE MANAGER  Follow-up Information     [facility] On 5/24/2017.    Specialties:  Hospice and Palliative Medicine, Hospice Services  Why:  Dr. Deutsch will monitor as OP via Hospice  Contact information:  [address]  [address]  [facility] [address]  [phone]               [facility] On [facility] Care Line - 24/7 Assistance  Registered [facility] nurses can provide appointment booking, health education, clinical advisement, and other advisory services.   Call for this free service at [phone].    Thank you for choosing [facility] for your care.  Within 48-72 hours after leaving the hospital you will receive a call from [facility] Nurses following up to see how you are doing. The team will ask you a few questions and the call will last approximately 20 minutes.     Please answer any calls you may receive from [facility] we want to continue to support you as you manage your healthcare needs. [facility] is happy to have the opportunity to serve you.     Sincerely,  Your [facility] Team,   THANK YOU FOR LETTING ME ASSIST WITH YOUR DISCHARGE PLANNING,     Sarah Gambino [facility]   [place]  [phone]

## 2019-04-30 NOTE — PLAN OF CARE
Problem: Patient Care Overview  Goal: Plan of Care Review  Outcome: Ongoing (interventions implemented as appropriate)  Pt remains in ICU HR remains elevated, PO metoprolol added today; MD aware. BP stable, pt afebrile and on 5 L NC. Denies pain or shortness of breath. Pt + for productive cough, mucinex given. Abreu placed today, UO adequate, pt diuresing well with PO lasix. Appetite poor, pt requests hospice and SW was consulted today. Pt's daughter is currently on her way in town from [place] and aware of condition. Denies pain, free from breakdown; precautions in place for pt safety and skin integrity. Plan of care reviewed with patient at bedside.       Statement Selected

## 2025-01-14 NOTE — ASSESSMENT & PLAN NOTE
Pt will be admitted to the ICU on diltiazem drip.  Cardiology has been consulted as well.  Pt is a DNR     needs device